# Patient Record
Sex: FEMALE | Race: WHITE | Employment: OTHER | URBAN - METROPOLITAN AREA
[De-identification: names, ages, dates, MRNs, and addresses within clinical notes are randomized per-mention and may not be internally consistent; named-entity substitution may affect disease eponyms.]

---

## 2018-04-06 ENCOUNTER — OFFICE VISIT (OUTPATIENT)
Dept: SURGERY | Age: 33
End: 2018-04-06

## 2018-04-06 VITALS
TEMPERATURE: 98.1 F | SYSTOLIC BLOOD PRESSURE: 118 MMHG | BODY MASS INDEX: 30.49 KG/M2 | DIASTOLIC BLOOD PRESSURE: 80 MMHG | HEIGHT: 65 IN | WEIGHT: 183 LBS

## 2018-04-06 DIAGNOSIS — K80.20 CALCULUS OF GALLBLADDER WITHOUT CHOLECYSTITIS WITHOUT OBSTRUCTION: Primary | ICD-10-CM

## 2018-04-06 PROCEDURE — G8417 CALC BMI ABV UP PARAM F/U: HCPCS | Performed by: SURGERY

## 2018-04-06 PROCEDURE — 99203 OFFICE O/P NEW LOW 30 MIN: CPT | Performed by: SURGERY

## 2018-04-06 PROCEDURE — G8427 DOCREV CUR MEDS BY ELIG CLIN: HCPCS | Performed by: SURGERY

## 2018-05-15 ENCOUNTER — TELEPHONE (OUTPATIENT)
Dept: SURGERY | Age: 33
End: 2018-05-15

## 2018-06-21 ENCOUNTER — TELEPHONE (OUTPATIENT)
Dept: SURGERY | Age: 33
End: 2018-06-21

## 2018-10-03 ENCOUNTER — APPOINTMENT (OUTPATIENT)
Dept: CT IMAGING | Age: 33
End: 2018-10-03
Payer: MEDICARE

## 2018-10-03 ENCOUNTER — HOSPITAL ENCOUNTER (EMERGENCY)
Age: 33
Discharge: HOME | End: 2018-10-03
Payer: MEDICARE

## 2018-10-03 VITALS
BODY MASS INDEX: 28.93 KG/M2 | OXYGEN SATURATION: 98 % | DIASTOLIC BLOOD PRESSURE: 87 MMHG | HEART RATE: 89 BPM | RESPIRATION RATE: 16 BRPM | SYSTOLIC BLOOD PRESSURE: 129 MMHG | TEMPERATURE: 97.7 F | HEIGHT: 66 IN | WEIGHT: 180 LBS

## 2018-10-03 DIAGNOSIS — S09.90XA INJURY OF HEAD, INITIAL ENCOUNTER: Primary | ICD-10-CM

## 2018-10-03 DIAGNOSIS — S00.81XA ABRASION OF FOREHEAD, INITIAL ENCOUNTER: ICD-10-CM

## 2018-10-03 PROCEDURE — 99283 EMERGENCY DEPT VISIT LOW MDM: CPT

## 2018-10-03 PROCEDURE — 70450 CT HEAD/BRAIN W/O DYE: CPT

## 2018-10-04 NOTE — ED PROVIDER NOTES
completed and is negative. Physical Exam:  Physical Exam   Constitutional: She appears well-developed and well-nourished. HENT:   Head: Normocephalic. Head is with abrasion and with contusion. Head is without raccoon's eyes, without Abraham's sign and without laceration. Right Ear: External ear normal.   Left Ear: External ear normal.   Nose: Nose normal.   Eyes: Pupils are equal, round, and reactive to light. Conjunctivae and EOM are normal. Right eye exhibits no discharge. Left eye exhibits no discharge. Neck: Normal range of motion. Neck supple. Cardiovascular: Normal rate, regular rhythm and normal heart sounds. Exam reveals no gallop and no friction rub. No murmur heard. Pulmonary/Chest: Effort normal and breath sounds normal. No respiratory distress. She has no wheezes. She has no rales. Abdominal: Soft. She exhibits no distension. There is no tenderness. There is no rebound and no guarding. Musculoskeletal: Normal range of motion. Neurological: She is alert. Disoriented: baseline. Skin: Skin is warm and dry. She is not diaphoretic. Psychiatric: She has a normal mood and affect. Her behavior is normal.   Nursing note and vitals reviewed. MEDICAL DECISION MAKING    Vitals:    Vitals:    10/03/18 2202   BP: 129/87   Pulse: 89   Resp: 16   Temp: 97.7 °F (36.5 °C)   TempSrc: Temporal   SpO2: 98%   Weight: 180 lb (81.6 kg)   Height: 5' 6\" (1.676 m)       LABS: Labs Reviewed - No data to display     Remainder of labs reviewed and were negative at this time or not returned at the time of this note. RADIOLOGY:   Non-plain film images such as CT, Ultrasound and MRI are read by the radiologist. Zion Rudolph PA-C have directly visualized the radiologic plain film image(s) with the below findings:        Interpretation per the Radiologist below, if available at the time of this note:    CT HEAD WO CONTRAST   Final Result   1.  Limited study with soft tissue swelling in the forehead but no fracture or   definite acute intracranial hemorrhage. 2. On the left there is otitis externa, otitis media, and mastoiditis. Ct Head Wo Contrast    Result Date: 10/3/2018  EXAMINATION: CT OF THE HEAD WITHOUT CONTRAST  10/3/2018 10:34 pm TECHNIQUE: CT of the head was performed without the administration of intravenous contrast. Dose modulation, iterative reconstruction, and/or weight based adjustment of the mA/kV was utilized to reduce the radiation dose to as low as reasonably achievable. COMPARISON: None. HISTORY: ORDERING SYSTEM PROVIDED HISTORY: head injury TECHNOLOGIST PROVIDED HISTORY: Has a \"code stroke\" or \"stroke alert\" been called? ->No Ordering Physician Provided Reason for Exam: head injury Acuity: Acute Type of Exam: Initial Relevant Medical/Surgical History: Head Injury (was banging head on table at dinner, MRDD patient. lac to forehead. ) FINDINGS: BRAIN/VENTRICLES: Evaluation is limited by motion artifact despite repeat imaging. No definite acute hemorrhage is identified. There are focal round areas of low-attenuation in the inferior basal ganglia bilaterally. Most likely these represent dilated perivascular spaces. No other area of abnormal brain attenuation is identified. The ventricles are normal in size and configuration. ORBITS: The visualized portion of the orbits demonstrate no acute abnormality. SINUSES: The visualized paranasal sinuses are clear. The frontal sinuses are developmentally absent. There is opacification of mastoid air cells and middle ear on the left with skin thickening in the external auditory canal. Chronic mastoiditis is noted on the right SOFT TISSUES/SKULL:  There is subcutaneous soft tissue swelling in the forehead. No fracture is identified. 1. Limited study with soft tissue swelling in the forehead but no fracture or definite acute intracranial hemorrhage. 2. On the left there is otitis externa, otitis media, and mastoiditis. [id number]  [phone]  Go to   If symptoms worsen      DISCHARGE MEDICATIONS:  New Prescriptions    No medications on file       DISCONTINUED MEDICATIONS:  Discontinued Medications    No medications on file              (Please note the MDM and HPI sections of this note were completed with a voice recognition program.  Efforts were made to edit the dictations but occasionally words are mis-transcribed.)    Electronically signed, Niranjan Silva PA-C,          [place]  10/03/18 3553

## 2020-02-14 ENCOUNTER — OFFICE VISIT (OUTPATIENT)
Dept: SURGERY | Age: 35
End: 2020-02-14
Payer: MEDICARE

## 2020-02-14 PROCEDURE — 99212 OFFICE O/P EST SF 10 MIN: CPT | Performed by: SURGERY

## 2020-02-14 PROCEDURE — G8484 FLU IMMUNIZE NO ADMIN: HCPCS | Performed by: SURGERY

## 2020-02-14 PROCEDURE — G8421 BMI NOT CALCULATED: HCPCS | Performed by: SURGERY

## 2020-02-14 PROCEDURE — G8428 CUR MEDS NOT DOCUMENT: HCPCS | Performed by: SURGERY

## 2020-02-14 ASSESSMENT — ENCOUNTER SYMPTOMS
EYES NEGATIVE: 1
RESPIRATORY NEGATIVE: 1
ALLERGIC/IMMUNOLOGIC NEGATIVE: 1
GASTROINTESTINAL NEGATIVE: 1

## 2020-02-14 NOTE — PROGRESS NOTES
Provider, MD   ibuprofen (ADVIL;MOTRIN) 400 MG tablet Take 400 mg by mouth every 6 hours as needed for Pain. Historical Provider, MD   ranitidine (ZANTAC) 150 MG tablet Take 150 mg by mouth 2 times daily. Historical Provider, MD   ferrous sulfate 325 (65 FE) MG tablet Take 325 mg by mouth daily (with breakfast). Historical Provider, MD   OLANZapine (ZYPREXA) 10 MG tablet Take 10 mg by mouth 2 times daily. Historical Provider, MD   magnesium hydroxide (MILK OF MAGNESIA) 400 MG/5ML suspension Take  by mouth daily as needed. Historical Provider, MD   loperamide (IMODIUM) 2 MG capsule Take 2 mg by mouth every 6 hours as needed. Historical Provider, MD   chlorproMAZINE (THORAZINE) 25 MG tablet Take 25 mg by mouth 3 times daily. Historical Provider, MD   EPINEPHrine HCl, Anaphylaxis, (EPIPEN IM) Inject  into the muscle. Historical Provider, MD   LORazepam (ATIVAN) 1 MG tablet Take 0.5 mg by mouth 2 times daily   Historical Provider, MD   prenatal vitamin (PRENATAL-S) 27-0.8 MG TABS Take 1 tablet by mouth daily. Historical Provider, MD   sertraline (ZOLOFT) 100 MG tablet Take 25 mg by mouth every 12 hours   Historical Provider, MD   aspirin 81 MG EC tablet Take 81 mg by mouth daily. Historical Provider, MD   loratadine (CLARITIN) 10 MG tablet Take 10 mg by mouth daily. Historical Provider, MD   tretinoin (RETIN-A) 0.05 % cream Apply  topically nightly. Apply topically nightly.    Historical Provider, MD   topiramate (TOPAMAX) 25 MG capsule Take 75 mg by mouth 2 times daily   Historical Provider, MD       Social History     Socioeconomic History    Marital status: Single     Spouse name: Not on file    Number of children: Not on file    Years of education: Not on file    Highest education level: Not on file   Occupational History    Not on file   Social Needs    Financial resource strain: Not on file    Food insecurity:     Worry: Not on file     Inability: Not on file   [email or web address] needs: Medical: Not on file     Non-medical: Not on file   Tobacco Use    Smoking status: Never Smoker    Smokeless tobacco: Never Used   Substance and Sexual Activity    Alcohol use: Not on file    Drug use: No    Sexual activity: Not on file   Lifestyle    Physical activity:     Days per week: Not on file     Minutes per session: Not on file    Stress: Not on file   Relationships    Social connections:     Talks on phone: Not on file     Gets together: Not on file     Attends [employer] service: Not on file     Active member of club or organization: Not on file     Attends meetings of clubs or organizations: Not on file     Relationship status: Not on file    Intimate partner violence:     Fear of current or ex partner: Not on file     Emotionally abused: Not on file     Physically abused: Not on file     Forced sexual activity: Not on file   Other Topics Concern    Not on file   Social History Narrative    Not on file       Review of Systems   Constitutional: Negative. HENT: Negative. Eyes: Negative. Respiratory: Negative. Cardiovascular: Negative. Gastrointestinal: Negative. Endocrine: Negative. Genitourinary: Negative. Musculoskeletal: Negative. Skin: Negative. Allergic/Immunologic: Negative. Neurological: Negative. Hematological: Negative. Psychiatric/Behavioral: Negative.        :   Physical Exam  Constitutional:       Appearance: She is well-developed. HENT:      Head: Normocephalic and atraumatic. Right Ear: External ear normal.      Left Ear: External ear normal.   Eyes:      Conjunctiva/sclera: Conjunctivae normal.   Neck:      Musculoskeletal: Normal range of motion and neck supple. Cardiovascular:      Rate and Rhythm: Normal rate and regular rhythm. Pulmonary:      Effort: Pulmonary effort is normal.      Breath sounds: Normal breath sounds. Musculoskeletal: Normal range of motion. Skin:     General: Skin is warm and dry. Neurological:      Mental Status: She is alert and oriented to person, place, and time. Psychiatric:         Behavior: Behavior normal.     2 cm sebaceous cyst of the medial left breast  There were no vitals taken for this visit. :      77-year-old female with autism and severe developmental delay who was referred for evaluation of a cyst on the left breast.  No breast history is able to be obtained per the patient or her caregivers. Physical examination reveals a 2 cm, noninfected sebaceous cyst of the left breast.      Plan:      No surgical intervention commended unless the sebaceous cyst of the left breast becomes bothersome or infected. Follow-up as needed.

## 2020-08-22 ENCOUNTER — HOSPITAL ENCOUNTER (EMERGENCY)
Age: 35
Discharge: HOME | End: 2020-08-22
Payer: MEDICARE

## 2020-08-22 ENCOUNTER — APPOINTMENT (OUTPATIENT)
Dept: GENERAL RADIOLOGY | Age: 35
End: 2020-08-22
Payer: MEDICARE

## 2020-08-22 VITALS
TEMPERATURE: 98 F | OXYGEN SATURATION: 96 % | SYSTOLIC BLOOD PRESSURE: 122 MMHG | DIASTOLIC BLOOD PRESSURE: 72 MMHG | HEART RATE: 88 BPM | RESPIRATION RATE: 16 BRPM

## 2020-08-22 PROCEDURE — U0002 COVID-19 LAB TEST NON-CDC: HCPCS

## 2020-08-22 PROCEDURE — U0003 INFECTIOUS AGENT DETECTION BY NUCLEIC ACID (DNA OR RNA); SEVERE ACUTE RESPIRATORY SYNDROME CORONAVIRUS 2 (SARS-COV-2) (CORONAVIRUS DISEASE [COVID-19]), AMPLIFIED PROBE TECHNIQUE, MAKING USE OF HIGH THROUGHPUT TECHNOLOGIES AS DESCRIBED BY CMS-2020-01-R: HCPCS

## 2020-08-22 PROCEDURE — 71045 X-RAY EXAM CHEST 1 VIEW: CPT

## 2020-08-22 PROCEDURE — 99283 EMERGENCY DEPT VISIT LOW MDM: CPT

## 2020-08-22 NOTE — ED PROVIDER NOTES
Take 1 tablet by mouth daily.  sertraline (ZOLOFT) 100 MG tablet Take 25 mg by mouth every 12 hours       aspirin 81 MG EC tablet Take 81 mg by mouth daily.  loratadine (CLARITIN) 10 MG tablet Take 10 mg by mouth daily.  tretinoin (RETIN-A) 0.05 % cream Apply  topically nightly. Apply topically nightly.  topiramate (TOPAMAX) 25 MG capsule Take 75 mg by mouth 2 times daily          ALLERGIES    Allergies   Allergen Reactions    Bee Venom     Codeine        FAMILY AND SOCIAL HISTORY    No family history on file.   Social History     Socioeconomic History    Marital status: Single     Spouse name: Not on file    Number of children: Not on file    Years of education: Not on file    Highest education level: Not on file   Occupational History    Not on file   Social Needs    Financial resource strain: Not on file    Food insecurity     Worry: Not on file     Inability: Not on file    Transportation needs     Medical: Not on file     Non-medical: Not on file   Tobacco Use    Smoking status: Never Smoker    Smokeless tobacco: Never Used   Substance and Sexual Activity    Alcohol use: Not on file    Drug use: No    Sexual activity: Not on file   Lifestyle    Physical activity     Days per week: Not on file     Minutes per session: Not on file    Stress: Not on file   Relationships    Social connections     Talks on phone: Not on file     Gets together: Not on file     Attends Baptism service: Not on file     Active member of club or organization: Not on file     Attends meetings of clubs or organizations: Not on file     Relationship status: Not on file    Intimate partner violence     Fear of current or ex partner: Not on file     Emotionally abused: Not on file     Physically abused: Not on file     Forced sexual activity: Not on file   Other Topics Concern    Not on file   Social History Narrative    Not on file       PHYSICAL EXAM    VITAL SIGNS: /72   Pulse 88 cerebrovascular dz,  chronic lung dz*(exclude Asthma), chronic renal dz, chronic liver dz= 1 pt each    Total= 3 points    Score ? 8: Low Risk  Workup: CXR ? Normal = low risk  If CXR shows infiltrate go to Intermediate Risk    Score 9-12: Intermediate Risk  Workup: CBC, CMP, Troponin, LDH, Lactate, CXR  If any below present, go to High Risk  GCS <15, > mild infiltrate, lobar infiltrate, multi-lobar infiltrate, lymphopenia, ? LDH, ? troponin, ?lactate, thrombocytopenia. Score >12:  High Risk? Admit. I instructed the patients guardian to have the patient follow up as an outpatient in 2 days. I instructed the guardian to return the patient to the ED immediately for any new or worsening symptoms. The patients guardian verbalizes understanding. FINAL IMPRESSION    1.  Cough        PLAN  Outpatient treatment, discharge instructions, and follow-up (see EMR)    (Please note that this note was completed with a voice recognition program.  Every attempt was made to edit the dictations, but inevitably there remain words that are mis-transcribed.)        Nereida Godoy, GABBIE - ANAIS  08/22/20 6211

## 2020-08-23 LAB — SARS-COV-2, PCR: DETECTED

## 2020-08-23 NOTE — RESULT ENCOUNTER NOTE
Patient's positive result has been appropriately evaluated by the provider pool. Patient was unable to be reached over the phone. Mobile number disconnected, home number is a work place, maybe you can reach her there tomorrow. Will try to reach patient again tomorrow per protocol.

## 2020-08-23 NOTE — RESULT ENCOUNTER NOTE
Covid results reviewed, positive. Please call and let patient know so they can be sure to follow full quarantine instructions.

## 2020-08-24 ENCOUNTER — CARE COORDINATION (OUTPATIENT)
Dept: CARE COORDINATION | Age: 35
End: 2020-08-24

## 2022-02-18 ENCOUNTER — HOSPITAL ENCOUNTER (EMERGENCY)
Age: 37
Discharge: HOME | End: 2022-02-18
Attending: EMERGENCY MEDICINE
Payer: MEDICARE

## 2022-02-18 VITALS
RESPIRATION RATE: 17 BRPM | OXYGEN SATURATION: 98 % | TEMPERATURE: 97.6 F | SYSTOLIC BLOOD PRESSURE: 116 MMHG | HEIGHT: 66 IN | BODY MASS INDEX: 32.62 KG/M2 | WEIGHT: 203 LBS | HEART RATE: 89 BPM | DIASTOLIC BLOOD PRESSURE: 79 MMHG

## 2022-02-18 DIAGNOSIS — L02.219 CELLULITIS AND ABSCESS OF TRUNK: Primary | ICD-10-CM

## 2022-02-18 DIAGNOSIS — L03.319 CELLULITIS AND ABSCESS OF TRUNK: Primary | ICD-10-CM

## 2022-02-18 PROCEDURE — 99282 EMERGENCY DEPT VISIT SF MDM: CPT

## 2022-02-18 RX ORDER — CEPHALEXIN 500 MG/1
500 CAPSULE ORAL 4 TIMES DAILY
Qty: 40 CAPSULE | Refills: 0 | Status: SHIPPED | OUTPATIENT
Start: 2022-02-18 | End: 2022-04-22

## 2022-02-18 RX ORDER — SULFAMETHOXAZOLE AND TRIMETHOPRIM 800; 160 MG/1; MG/1
1 TABLET ORAL 2 TIMES DAILY
Qty: 20 TABLET | Refills: 0 | Status: SHIPPED | OUTPATIENT
Start: 2022-02-18 | End: 2022-02-28

## 2022-02-18 NOTE — ED NOTES
Pt is special needs with caregiver at bedside. Unable to have appropriate conversation. Pt has noted left breast abscess, warm to touch, with a great amount of surrounding redness.       Divya Dyer RN  02/18/22 1730

## 2022-02-18 NOTE — ED PROVIDER NOTES
I independently performed a history and physical on Romina Lynch. I personally saw the patient and performed a substantive portion of the visit including all aspects of the medical decision making. Briefly, this is a 44 y.o. female here for breast infection. Over left medial breast.  Never had this before. It is swollen, red. Patient is unable to give any history. She is in her baseline mental status. She is tolerating oral intake. No fevers. Has not appeared to be in pain. On exam,   General: Patient is in no acute distress  Skin: No cyanosis. Erythema surrounding what appears to be an abscess of her left medial breast with warmth and tenderness  HEENT: Moist mucous membranes  Heart: Regular rate, regular rhythm  Lung: No respiratory distress  Abdomen: Soft, nontender  Neuro: Moving all extremities, no facial droop, no slurred speech, answers questions appropriately        Screenings   Smoot Coma Scale  Eye Opening: Spontaneous  Best Verbal Response: Oriented (pt is at baseline)  Best Motor Response: Obeys commands  Smoot Coma Scale Score: 15        MDM  Briefly, this is a 44 y.o. female here for left breast infection. Appears to have superficial abscess with surrounding cellulitis. Will prescribe Bactrim and Keflex. Will refer to breast surgery team for incision and drainage. Vitals unremarkable. She is tolerating oral intake and is at her baseline mental status. No signs of sepsis/end organ damage.  .         Patient Referrals:  Judi Deutsch MD  [address] [facility] [address] [phone]    Schedule an appointment as soon as possible for a visit in 3 days  For re-check    [facility]  [address] [place] [address]  [phone]    As needed      Discharge Medications:  New Prescriptions    CEPHALEXIN (KEFLEX) 500 MG CAPSULE    Take 1 capsule by mouth 4 times daily    SULFAMETHOXAZOLE-TRIMETHOPRIM (BACTRIM DS) 800-160 MG PER

## 2022-02-18 NOTE — ED NOTES
Bed: 07  Expected date:   Expected time:   Means of arrival:   Comments:  Amber Rick RN  02/18/22 9002

## 2022-02-18 NOTE — ED PROVIDER NOTES
[facility]        Pt Name: Leeann Kovacs  MRN: [id number]  [place] 1985  Date of evaluation: 2/18/2022  Provider: Nuria Saez PA-C  PCP: Theodore Wiggins  Note Started: 10:05 AM EST        I have seen and evaluated this patient with my supervising physician Jeremy Ibanez MD.    [facility]       Chief Complaint   Patient presents with    Abscess     hard knot noted to left breast with some redness and warmth to area, no drainage, pt is special needs       HISTORY OF PRESENT ILLNESS   (Location, Timing/Onset, Context/Setting, Quality, Duration, Modifying Factors, Severity, Associated Signs and Symptoms)  Note limiting factors. Chief Complaint: Breast abscess    Leeann Kovacs is a 44 y.o. female who presents to the emergency department from care works with her caregiver [place]. Patient has history of autism and bipolar disorder. Not speaking and only making noises at this time. Caregiver states she has been acting normally. There is been no vomiting, appetite change, fevers or any other symptoms. States that this abscess is developed over the past 24 to 36 hours. Has history of skin infections in the past but unsure of any known history of MRSA. Caregiver denies any other symptoms. Nursing Notes were all reviewed and agreed with or any disagreements were addressed in the HPI. REVIEW OF SYSTEMS    (2-9 systems for level 4, 10 or more for level 5)     Review of Systems    Positives and Pertinent negatives as per HPI. Except as noted above in the ROS, all other systems were reviewed and negative.        PAST MEDICAL HISTORY     Past Medical History:   Diagnosis Date    Autism     Bipolar 1 disorder ([address]     Depression     Esophagitis     Gastric ulcer     History of ear infections     Insomnia     OCD (obsessive compulsive disorder)          SURGICAL HISTORY     Past Surgical History:   Procedure Laterality Date   [address]       Discharge Medication List as of 2/18/2022 11:11 AM      CONTINUE these medications which have NOT CHANGED    Details   norgestrel-ethinyl estradiol (LOW-OGESTREL) 0.3-30 MG-MCG per tablet Take 1 tablet by mouth daily      FLUoxetine (PROZAC) 20 MG capsule Take 20 mg by mouth daily      Propanediol LIQD by Does not apply route      traZODone (DESYREL) 100 MG tablet Take 75 mg by mouth 3 times daily      valACYclovir (VALTREX) 1 G tablet Take 1,000 mg by mouth as needed      polyethylene glycol (GLYCOLAX) packet Take 17 g by mouth daily as needed. meclizine (ANTIVERT) 12.5 MG tablet Take 12.5 mg by mouth 2 times daily. propranolol (INDERAL) 80 MG tablet Take 40 mg by mouth 3 times daily       simethicone (MYLANTA GAS) 125 MG chewable tablet Take 125 mg by mouth every 6 hours as needed for Flatulence. acetaminophen (TYLENOL) 325 MG tablet Take 650 mg by mouth every 4 hours as needed for Pain. clonazePAM (KLONOPIN) 0.5 MG tablet Take 0.5 mg by mouth nightly as needed for Anxiety. ibuprofen (ADVIL;MOTRIN) 400 MG tablet Take 400 mg by mouth every 6 hours as needed for Pain.      ranitidine (ZANTAC) 150 MG tablet Take 150 mg by mouth 2 times daily. ferrous sulfate 325 (65 FE) MG tablet Take 325 mg by mouth daily (with breakfast). OLANZapine (ZYPREXA) 10 MG tablet Take 10 mg by mouth 2 times daily. magnesium hydroxide (MILK OF MAGNESIA) 400 MG/5ML suspension Take  by mouth daily as needed. loperamide (IMODIUM) 2 MG capsule Take 2 mg by mouth every 6 hours as needed. chlorproMAZINE (THORAZINE) 25 MG tablet Take 25 mg by mouth 3 times daily. EPINEPHrine HCl, Anaphylaxis, (EPIPEN IM) Inject  into the muscle. LORazepam (ATIVAN) 1 MG tablet Take 0.5 mg by mouth 2 times daily       prenatal vitamin (PRENATAL-S) 27-0.8 MG TABS Take 1 tablet by mouth daily.         sertraline (ZOLOFT) 100 MG tablet Take 25 mg by mouth every 12 hours       aspirin 81 MG EC tablet Take 81 mg by mouth daily. loratadine (CLARITIN) 10 MG tablet Take 10 mg by mouth daily. tretinoin (RETIN-A) 0.05 % cream Apply  topically nightly. Apply topically nightly. , Topical, Historical Med      topiramate (TOPAMAX) 25 MG capsule Take 75 mg by mouth 2 times daily                ALLERGIES     Bee venom and Codeine    FAMILYHISTORY     No family history on file. SOCIAL HISTORY       Social History     Tobacco Use    Smoking status: Never Smoker    Smokeless tobacco: Never Used   Vaping Use    Vaping Use: Never used   Substance Use Topics    Alcohol use: Not on file    Drug use: No       SCREENINGS    [place] Coma Scale  Eye Opening: Spontaneous  Best Verbal Response: Oriented (pt is at baseline)  Best Motor Response: Obeys commands  [place] Coma Scale Score: 15        PHYSICAL EXAM    (up to 7 for level 4, 8 or more for level 5)     ED Triage Vitals [02/18/22 0928]   BP Temp Temp Source Pulse Resp SpO2 Height Weight   116/79 -- Tympanic 89 17 98 % 5' 6\" (1.676 m) 203 lb (92.1 kg)       Physical Exam  Vitals and nursing note reviewed. Constitutional:       Appearance: She is well-developed. She is not diaphoretic. HENT:      Head: Atraumatic. Nose: Nose normal.   Eyes:      General:         Right eye: No discharge. Left eye: No discharge. Cardiovascular:      Rate and Rhythm: Normal rate and regular rhythm. Heart sounds: No murmur heard. No friction rub. No gallop. Pulmonary:      Effort: Pulmonary effort is normal. No respiratory distress. Breath sounds: No stridor. No wheezing, rhonchi or rales. Abdominal:      General: Bowel sounds are normal. There is no distension. Palpations: Abdomen is soft. There is no mass. Tenderness: There is no abdominal tenderness. There is no guarding or rebound. Hernia: No hernia is present.    Musculoskeletal:         General: No swelling. Normal range of motion. Cervical back: Normal range of motion. Skin:     General: Skin is warm and dry. Findings: No erythema or rash. Comments: There is a abscess over the medial left breast measuring 4 x 2-1/2 cm with some cellulitis streaking laterally with cellulitis measuring approximately 12 x 5 cm total.  No crepitus or streaking. Neurological:      Mental Status: She is alert and oriented to person, place, and time. Cranial Nerves: No cranial nerve deficit. Psychiatric:         Behavior: Behavior normal.         DIAGNOSTIC RESULTS   LABS:    Labs Reviewed - No data to display    When ordered only abnormal lab results are displayed. All other labs were within normal range or not returned as of this dictation. EKG: When ordered, EKG's are interpreted by the Emergency Department Physician in the absence of a cardiologist.  Please see their note for interpretation of EKG. RADIOLOGY:   Non-plain film images such as CT, Ultrasound and MRI are read by the radiologist. Plain radiographic images are visualized and preliminarily interpreted by the ED Provider with the below findings:        Interpretation per the Radiologist below, if available at the time of this note:    No orders to display     No results found.         PROCEDURES   Unless otherwise noted below, none     Procedures    CRITICAL CARE TIME       CONSULTS:  None      EMERGENCY DEPARTMENT COURSE and DIFFERENTIAL DIAGNOSIS/MDM:   Vitals:    Vitals:    02/18/22 0928 02/18/22 1011   BP: 116/79    Pulse: 89    Resp: 17    Temp:  97.6 °F (36.4 °C)   TempSrc: Tympanic Axillary   SpO2: 98%    Weight: 203 lb (92.1 kg)    Height: 5' 6\" (1.676 m)        Patient was given the following medications:  Medications - No data to display        Patient presented with abscess and cellulitis of the left breast.  Given difficulties with patient's autism and unable to be sit still and where this abscess is feel better just to place patient on antibiotics and follow-up with breast surgeon for better cosmetic and overall care of this. She is not toxic appearance and no sign of sepsis. She has been acting normally. Will start on Keflex and Bactrim. Nothing distress streaking cellulitis requiring IV antibiotics, necrotizing fasciitis. Always a concern for possible malignancy causing this also so feel better with blood specialist following up with breast specialist.  Return here for any worsening of symptoms or problems at home. FINAL IMPRESSION      1.  Cellulitis and abscess of trunk          DISPOSITION/PLAN   DISPOSITION Decision To Discharge 02/18/2022 10:32:48 AM      PATIENT REFERRED TO:  Monica Bria, [address]  [phone]    Schedule an appointment as soon as possible for a visit in 3 days  For re-check    [facility] Emergency Department  [address] [place] [address]  [phone]    As needed      DISCHARGE MEDICATIONS:  Discharge Medication List as of 2/18/2022 11:11 AM      START taking these medications    Details   sulfamethoxazole-trimethoprim (BACTRIM DS) 800-160 MG per tablet Take 1 tablet by mouth 2 times daily for 10 days, Disp-20 tablet, R-0Print      cephALEXin (KEFLEX) 500 MG capsule Take 1 capsule by mouth 4 times daily, Disp-40 capsule, R-0Print             DISCONTINUED MEDICATIONS:  Discharge Medication List as of 2/18/2022 11:11 AM                 (Please note that portions of this note were completed with a voice recognition program.  Efforts were made to edit the dictations but occasionally words are mis-transcribed.)    Brandy Aden PA-C (electronically signed)           Brandy Aden PA-C  02/18/22 3948

## 2022-04-13 ENCOUNTER — OFFICE VISIT (OUTPATIENT)
Dept: BREAST CENTER | Age: 37
End: 2022-04-13
Payer: MEDICARE

## 2022-04-13 ENCOUNTER — TELEPHONE (OUTPATIENT)
Dept: SURGERY | Age: 37
End: 2022-04-13

## 2022-04-13 VITALS
BODY MASS INDEX: 33.68 KG/M2 | HEIGHT: 66 IN | WEIGHT: 209.6 LBS | SYSTOLIC BLOOD PRESSURE: 126 MMHG | HEART RATE: 100 BPM | DIASTOLIC BLOOD PRESSURE: 93 MMHG

## 2022-04-13 DIAGNOSIS — N63.20 LEFT BREAST MASS: Primary | ICD-10-CM

## 2022-04-13 PROCEDURE — G8417 CALC BMI ABV UP PARAM F/U: HCPCS | Performed by: SURGERY

## 2022-04-13 PROCEDURE — G8427 DOCREV CUR MEDS BY ELIG CLIN: HCPCS | Performed by: SURGERY

## 2022-04-13 PROCEDURE — 99214 OFFICE O/P EST MOD 30 MIN: CPT | Performed by: SURGERY

## 2022-04-13 NOTE — TELEPHONE ENCOUNTER
Breast History:  History of Previous Breast Biopsy:no  Self Breast Exams Completed:no  Family History of Breast Cancer:no    Family History of Other Cancers:no   Ashkenazi Lutheran Decent:no  Bra Size: unknown    Gyne History:  : 0,   Para: 0  Age of Menarche: unknown  Age of Menopause: No  Age of first live Birth:N/a  History of Hysterectomy / TAYLOR-BSO: no  History of OCP's/HRT:unknown     Family History or personal history of Ovarian Cancer: unknown     Patient is non-verbal      12.9% Lifetime Risk

## 2022-04-13 NOTE — PATIENT INSTRUCTIONS
Mammogram results were discussed with patient today in office  Breast exam was unremarkable for any palpable masses  Discussed surgery Risks / recovery / aftercare  Needs a Pre Op Exam ( Exam form Given )   Surgery date - May 5, 2022   Surgery Consent signed      Healthy Lifestyle Recommendations: healthy diet (decrease consumption of red meat, increase fresh fruits and vegetables), decreased alcohol consumption (less than 4 drinks/week), adequate sleep (goal 6-8 hours), routine exercise (goal 150 minutes/week or greater), weight control.

## 2022-04-13 NOTE — PROGRESS NOTES
Subjective:      Patient ID: Rossy Desai is a 44 y.o. female. HPI   Chief Complaint   Patient presents with    Consultation     NP - Left Breast Cyst, referred by Dr. Carol Godoy     Patient presented in February to the ER in [facility] with an infected left breast cyst, treated with antibiotics. This cyst has been present for several years. No pain. Patient has history of autism and bipolar disorder. Not speaking and only making noises at this time. She is here with her caregiver, Fabio Umanzor. History obtained from chart and caregivers.     Breast History:  History of Previous Breast Biopsy:no  Self Breast Exams Completed:no  Family History of Breast Cancer:no    Family History of Other Cancers:no   Ashkenazi Gnosticist Decent:no  Bra Size: unknown     Gyne History:  : 0,   Para: 0  Age of Menarche: unknown  Age of Menopause: No  Age of first live Birth:N/a  History of Hysterectomy / TAYLOR-BSO: no  History of OCP's/HRT:unknown     Family History or personal history of Ovarian Cancer: unknown      Patient is non-verbal        12.9% Lifetime Risk                    Past Medical History:   Diagnosis Date    Autism     Bipolar 1 disorder ([facility] [address]     Depression     Esophagitis     Gastric ulcer     History of ear infections     Insomnia     OCD (obsessive compulsive disorder)        Past Surgical History:   Procedure Laterality Date    DENTAL SURGERY         Current Outpatient Medications   Medication Sig Dispense Refill    cephALEXin (KEFLEX) 500 MG capsule Take 1 capsule by mouth 4 times daily 40 capsule 0    norgestrel-ethinyl estradiol (LOW-OGESTREL) 0.3-30 MG-MCG per tablet Take 1 tablet by mouth daily      FLUoxetine (PROZAC) 20 MG capsule Take 20 mg by mouth daily      Propanediol LIQD by Does not apply route      traZODone (DESYREL) 100 MG tablet Take 75 mg by mouth 3 times daily      valACYclovir (VALTREX) 1 G tablet Take 1,000 mg by mouth as needed      polyethylene glycol (GLYCOLAX) packet Take 17 g by mouth daily as needed.  meclizine (ANTIVERT) 12.5 MG tablet Take 12.5 mg by mouth 2 times daily.  propranolol (INDERAL) 80 MG tablet Take 40 mg by mouth 3 times daily       simethicone (MYLANTA GAS) 125 MG chewable tablet Take 125 mg by mouth every 6 hours as needed for Flatulence.  acetaminophen (TYLENOL) 325 MG tablet Take 650 mg by mouth every 4 hours as needed for Pain.  clonazePAM (KLONOPIN) 0.5 MG tablet Take 0.5 mg by mouth nightly as needed for Anxiety.  ibuprofen (ADVIL;MOTRIN) 400 MG tablet Take 400 mg by mouth every 6 hours as needed for Pain.  ranitidine (ZANTAC) 150 MG tablet Take 150 mg by mouth 2 times daily.  ferrous sulfate 325 (65 FE) MG tablet Take 325 mg by mouth daily (with breakfast).  OLANZapine (ZYPREXA) 10 MG tablet Take 10 mg by mouth 2 times daily.  magnesium hydroxide (MILK OF MAGNESIA) 400 MG/5ML suspension Take  by mouth daily as needed.  loperamide (IMODIUM) 2 MG capsule Take 2 mg by mouth every 6 hours as needed.  chlorproMAZINE (THORAZINE) 25 MG tablet Take 25 mg by mouth 3 times daily.  EPINEPHrine HCl, Anaphylaxis, (EPIPEN IM) Inject  into the muscle.  LORazepam (ATIVAN) 1 MG tablet Take 0.5 mg by mouth 2 times daily       prenatal vitamin (PRENATAL-S) 27-0.8 MG TABS Take 1 tablet by mouth daily.  sertraline (ZOLOFT) 100 MG tablet Take 25 mg by mouth every 12 hours       aspirin 81 MG EC tablet Take 81 mg by mouth daily.  loratadine (CLARITIN) 10 MG tablet Take 10 mg by mouth daily.  tretinoin (RETIN-A) 0.05 % cream Apply  topically nightly. Apply topically nightly.  topiramate (TOPAMAX) 25 MG capsule Take 75 mg by mouth 2 times daily        No current facility-administered medications for this visit.        Social History     Socioeconomic History    Marital status: Single     Spouse name: Not on file    Number of children: Not on file    Years of education: Not on file    Highest education level: Not on file   Occupational History    Not on file   Tobacco Use    Smoking status: Never Smoker    Smokeless tobacco: Never Used   Vaping Use    Vaping Use: Never used   Substance and Sexual Activity    Alcohol use: Not on file    Drug use: No    Sexual activity: Not on file   Other Topics Concern    Not on file   Social History Narrative    Not on file     Social Determinants of Health     Financial Resource Strain:     Difficulty of Paying Living Expenses: Not on file   Food Insecurity:     Worried About Running Out of Food in the Last Year: Not on file    [place] of Food in the Last Year: Not on file   Transportation Needs:     Lack of Transportation (Medical): Not on file    Lack of Transportation (Non-Medical): Not on file   Physical Activity:     Days of Exercise per Week: Not on file    Minutes of Exercise per Session: Not on file   Stress:     Feeling of Stress : Not on file   Social Connections:     Frequency of Communication with Friends and Family: Not on file    Frequency of Social Gatherings with Friends and Family: Not on file    Attends Baptism Services: Not on file    Active Member of [address] [place] [address] or Organizations: Not on file    Attends Club or Organization Meetings: Not on file    Marital Status: Not on file   Intimate Partner Violence:     Fear of Current or Ex-Partner: Not on file    Emotionally Abused: Not on file    Physically Abused: Not on file    Sexually Abused: Not on file   Housing Stability:     Unable to Pay for Housing in the Last Year: Not on file    Number of [place] in the Last Year: Not on file    Unstable Housing in the Last Year: Not on file       Objective:   Physical Exam    Left breast - Pendulous breast, 1.5 cm raised mass upper inner quadrant near sternum with central punctum. No signs of infection. No other masses. Right breast - Pendulous breast, no masses, no skin or nipple changes.   No cervical or axillary adenopathy. Assessment:      Diagnosis Orders   1. Left breast mass            Plan:     We discussed management options. I recommend we proceed with excision of her left breast mass since it is symptomatic. The indications for the planned procedure, along with the potential benefits and risks which include but are not limited to the risk of anesthesia, bleeding, infection, possible failed operation, possible need for additional surgery pending final pathologic assessment, lymphedema, sensation changes, nerve injury, persistent pain, and unappealing cosmetics were reviewed. The discussion I have done encompasses risks, benefits, and side effects related to the alternatives and the risks related to not receiving the proposed care, treatment, and services. All questions were answered and she agrees to proceed. On this date 04/13/22 I have spent 60 minutes reviewing previous notes, test results, and face to face with the patient discussing the diagnosis and importance of compliance with the treatment plan as well as documenting on the day of the visit.      Electronically signed by Zuleika Pierson MD on 4/13/2022 at 11:31 AM

## 2022-04-22 ENCOUNTER — TELEPHONE (OUTPATIENT)
Dept: SURGERY | Age: 37
End: 2022-04-22

## 2022-04-22 RX ORDER — NYSTATIN 100000 U/G
OINTMENT TOPICAL 2 TIMES DAILY
COMMUNITY

## 2022-04-22 RX ORDER — FAMOTIDINE 20 MG/1
20 TABLET, FILM COATED ORAL DAILY
COMMUNITY

## 2022-04-22 RX ORDER — CLOTRIMAZOLE 1 %
CREAM (GRAM) TOPICAL 2 TIMES DAILY
COMMUNITY

## 2022-04-22 RX ORDER — MELOXICAM 7.5 MG/1
7.5 TABLET ORAL DAILY
COMMUNITY

## 2022-04-22 RX ORDER — PROPRANOLOL HYDROCHLORIDE 40 MG/1
40 TABLET ORAL 3 TIMES DAILY
COMMUNITY

## 2022-04-22 RX ORDER — LANOLIN ALCOHOL/MO/W.PET/CERES
1000 CREAM (GRAM) TOPICAL DAILY
COMMUNITY

## 2022-04-22 RX ORDER — CHOLECALCIFEROL (VITAMIN D3) 125 MCG
CAPSULE ORAL
COMMUNITY

## 2022-04-22 RX ORDER — FLUTICASONE PROPIONATE 50 MCG
1 SPRAY, SUSPENSION (ML) NASAL DAILY
COMMUNITY

## 2022-04-22 RX ORDER — NYSTATIN 10B UNIT
POWDER (EA) MISCELLANEOUS 2 TIMES DAILY
COMMUNITY

## 2022-04-22 RX ORDER — GINSENG 100 MG
CAPSULE ORAL PRN
COMMUNITY

## 2022-04-22 RX ORDER — FLUVOXAMINE MALEATE 50 MG/1
50 TABLET, COATED ORAL 2 TIMES DAILY
COMMUNITY

## 2022-04-22 RX ORDER — TRAZODONE HYDROCHLORIDE 50 MG/1
50 TABLET ORAL NIGHTLY
COMMUNITY

## 2022-04-22 NOTE — FLOWSHEET NOTE
Preoperative Screening for Elective Surgery/Invasive Procedures While COVID-19 present in the community     1. Have you tested positive or have been told to self-isolate for COVID-19 like symptoms within the past 28 days? 2. Do you currently have any of the following symptoms? ? Fever >100.0 F or 99.9 F in immunocompromised patients? ? New onset cough, shortness of breath or difficulty breathing? ? New onset sore throat, myalgia (muscle aches and pains), headache, loss of taste/smell or diarrhea? 3. Have you had a potential exposure to COVID-19 within the past 14 days by:  ? Close contact with a confirmed case? ? Close contact with a healthcare worker,  or essential infrastructure worker (grocery store, [employer], gas station, public utilities or transportation)? ? Do you reside in a congregate setting such as; skilled nursing facility, adult home, correctional facility, homeless shelter or other institutional setting? ? Have you had recent travel to a known COVID-19 hotspot? * Admitted with diarrhea? [] YES    [x]  NO     *Prior history of C-Diff. In last 3 months? [] YES    [x]  NO     *Antibiotic use in the past 6-8 weeks? [x]  NO    []  YES      If yes, which: REASON_________________     *Prior hospitalization or nursing home in the last month? []  YES    []  NO     SAFETY FIRST. .call before you fall    [facility] time___5/5/22 0900________        Surgery time_1035___________    Do not eat or drink anything after 12:00 midnight prior to your surgery. This includes water chewing gum, mints and ice chips- the Day of Surgery. You may brush your teeth and gargle the morning of your surgery, but do not swallow the water     Please see your family doctor/pediatrician for a history and physical and/or questions concerning medications. Bring any test results/reports from your physicians office.    If you are under the care of a heart doctor or specialist doctor, please be aware that you may be asked to them for clearance    You may be asked to stop blood thinners such as Coumadin, Plavix, Fragmin, Lovenox, etc., or any anti-inflammatories such as:  Aspirin, Ibuprofen, Advil, Naproxen prior to your surgery. We also ask that you stop any OTC medications such as fish oil, vitamin E, glucosamine, garlic, Multivitamins, COQ 10, etc.    We ask that you do not smoke 24 hours prior to surgery  We ask that you do not  drink any alcoholic beverages 24 hours prior to surgery     You must make arrangements for a responsible adult to take you home after your surgery. For your safety you will not be allowed to leave alone or drive yourself home. Your surgery will be cancelled if you do not have a ride home. Also for your safety, it is strongly suggested that someone stay with you the first 24 hours after your surgery. A parent or legal guardian must accompany a child scheduled for surgery and plan to stay at the hospital until the child is discharged. Please do not bring other children with you. For your comfort, please wear simple loose fitting clothing to the hospital.  Please do not bring valuables. Do not wear any make-up or nail polish on your fingers or toes. For your safety, please do not wear any jewelry or body piercing's on the day of surgery. All jewelry must be removed. If you have dentures, they will be removed before going to operating room. For your convenience, we will provide you with a container. If you wear contact lenses or glasses, they will be removed, please bring a case for them. If you have a living will and a durable power of  for healthcare, please bring in a copy.      As part of our patient safety program to minimize surgical site infections, we ask you to do the following:    · Please notify your surgeon if you develop any illness between         now and the day of your surgery. · This includes a cough, cold, fever, sore throat, nausea,         or vomiting, and diarrhea, etc.  ·  Please notify your surgeon if you experience dizziness, shortness         of breath or blurred vision between now and the time of your surgery. Do not shave your operative site 96 hours prior to surgery. For face and neck surgery, men may use an electric razor 48 hours   prior to surgery. You may shower the night before surgery or the morning of   your surgery with an antibacterial soap. You will need to bring a photo ID and insurance card     If you use a C-pap or Bi-pap machine, please bring your machine with you to the hospital     Our goal is to provide you with excellent care, therefore, visitors will be limited to so that we may focus on providing this care for you. Please contact your surgeon office, if you have any further questions. [facility] phone number:  [address] PAT fax number:  [phone]    Please note these are generalized instructions for all surgical cases, you may be provided with more specific instructions according to your surgery.

## 2022-04-22 NOTE — TELEPHONE ENCOUNTER
Spoke with Orlando Urbina [facility]) regarding scheduled surgery on 05/05/2022 with Dr. John Cunha. Patient is asked to arrive by 9:10 am @ [address] after midnight. May take any Heart or Blood Pressure medication with a small sip of water to wash them down. You will need someone to drive you home following this procedure. Please bring a Photo ID & Insurance card with you, and check-in at the Surgery Desk down the right-hand hallway on the first floor. Patient has seen PCP for Pre-op H & P on 04/22/22. Surgery is scheduled to start at approx. 10:40 am and should take approx. 60 minutes. If the hospital needs any further information, someone will give you a call. Patient expressed a verbal understanding of these instructions and had no further questions at this time. Call ended.

## 2022-04-22 NOTE — FLOWSHEET NOTE
DOS 5/5/22 Dr. Vu President     Pt. Is a 44year old woman who lives in a group home. She is autistic,bipolar, nonverbal, gets around mostly in a wheel chair, can walk a little with asst., According to [facility] , the patient is \"her own POA\"  She has a sister who is blind, so Cem Andrews has the power to sign her consents. Cem Andrews will be coming with her the day of the procedure she will bring her living will. H&P To be done by Dr. Triston Bishop with [facility]. [phone]. [facility]. Understands that it needs to say \"cleared for surgery. \" Need to see if medically cleared and if needs any other clearance-Dr. Gabriel's orders say, \" additional clearance to be determined by PCP. \"      UPDATE: per Sp Sky okay to h.p scanned in on 4/26 under [facility] from [facility] [phone]

## 2022-05-04 ENCOUNTER — ANESTHESIA EVENT (OUTPATIENT)
Dept: OPERATING ROOM | Age: 37
End: 2022-05-04
Payer: MEDICARE

## 2022-05-04 NOTE — PROGRESS NOTES
Brigitte hernandez-caregiver for patient called pat for review of pre-op instructions-pre-op instructions reviewed with selena and selena verb understanding.  Brigitte Aiken bringing copy of h&p done by dr. Moses Villafana on 4/22/2022

## 2022-05-05 ENCOUNTER — HOSPITAL ENCOUNTER (OUTPATIENT)
Age: 37
Setting detail: OUTPATIENT SURGERY
Discharge: OTHER INSTITUTION | End: 2022-05-05
Attending: SURGERY | Admitting: SURGERY
Payer: MEDICARE

## 2022-05-05 ENCOUNTER — ANESTHESIA (OUTPATIENT)
Dept: OPERATING ROOM | Age: 37
End: 2022-05-05
Payer: MEDICARE

## 2022-05-05 VITALS
OXYGEN SATURATION: 96 % | SYSTOLIC BLOOD PRESSURE: 89 MMHG | RESPIRATION RATE: 6 BRPM | DIASTOLIC BLOOD PRESSURE: 53 MMHG

## 2022-05-05 VITALS
OXYGEN SATURATION: 99 % | RESPIRATION RATE: 16 BRPM | DIASTOLIC BLOOD PRESSURE: 60 MMHG | TEMPERATURE: 96.9 F | BODY MASS INDEX: 32.14 KG/M2 | WEIGHT: 200 LBS | HEART RATE: 72 BPM | HEIGHT: 66 IN | SYSTOLIC BLOOD PRESSURE: 124 MMHG

## 2022-05-05 DIAGNOSIS — N63.20 LEFT BREAST MASS: ICD-10-CM

## 2022-05-05 PROCEDURE — 3700000000 HC ANESTHESIA ATTENDED CARE: Performed by: SURGERY

## 2022-05-05 PROCEDURE — 3600000012 HC SURGERY LEVEL 2 ADDTL 15MIN: Performed by: SURGERY

## 2022-05-05 PROCEDURE — 7100000011 HC PHASE II RECOVERY - ADDTL 15 MIN: Performed by: SURGERY

## 2022-05-05 PROCEDURE — 2500000003 HC RX 250 WO HCPCS: Performed by: NURSE ANESTHETIST, CERTIFIED REGISTERED

## 2022-05-05 PROCEDURE — 2500000003 HC RX 250 WO HCPCS: Performed by: SURGERY

## 2022-05-05 PROCEDURE — 7100000010 HC PHASE II RECOVERY - FIRST 15 MIN: Performed by: SURGERY

## 2022-05-05 PROCEDURE — 2580000003 HC RX 258: Performed by: SURGERY

## 2022-05-05 PROCEDURE — 19120 REMOVAL OF BREAST LESION: CPT | Performed by: SURGERY

## 2022-05-05 PROCEDURE — 6360000002 HC RX W HCPCS: Performed by: NURSE ANESTHETIST, CERTIFIED REGISTERED

## 2022-05-05 PROCEDURE — 3700000001 HC ADD 15 MINUTES (ANESTHESIA): Performed by: SURGERY

## 2022-05-05 PROCEDURE — 88304 TISSUE EXAM BY PATHOLOGIST: CPT

## 2022-05-05 PROCEDURE — 6360000002 HC RX W HCPCS: Performed by: SURGERY

## 2022-05-05 PROCEDURE — 2709999900 HC NON-CHARGEABLE SUPPLY: Performed by: SURGERY

## 2022-05-05 PROCEDURE — 2580000003 HC RX 258: Performed by: ANESTHESIOLOGY

## 2022-05-05 PROCEDURE — 7100000000 HC PACU RECOVERY - FIRST 15 MIN: Performed by: SURGERY

## 2022-05-05 PROCEDURE — 3600000002 HC SURGERY LEVEL 2 BASE: Performed by: SURGERY

## 2022-05-05 PROCEDURE — A4217 STERILE WATER/SALINE, 500 ML: HCPCS | Performed by: SURGERY

## 2022-05-05 PROCEDURE — 7100000001 HC PACU RECOVERY - ADDTL 15 MIN: Performed by: SURGERY

## 2022-05-05 PROCEDURE — 6370000000 HC RX 637 (ALT 250 FOR IP): Performed by: SURGERY

## 2022-05-05 RX ORDER — SODIUM CHLORIDE 0.9 % (FLUSH) 0.9 %
5-40 SYRINGE (ML) INJECTION EVERY 12 HOURS SCHEDULED
Status: DISCONTINUED | OUTPATIENT
Start: 2022-05-05 | End: 2022-05-05 | Stop reason: HOSPADM

## 2022-05-05 RX ORDER — FENTANYL CITRATE 50 UG/ML
25 INJECTION, SOLUTION INTRAMUSCULAR; INTRAVENOUS EVERY 5 MIN PRN
Status: DISCONTINUED | OUTPATIENT
Start: 2022-05-05 | End: 2022-05-05 | Stop reason: HOSPADM

## 2022-05-05 RX ORDER — DROPERIDOL 2.5 MG/ML
0.62 INJECTION, SOLUTION INTRAMUSCULAR; INTRAVENOUS
Status: DISCONTINUED | OUTPATIENT
Start: 2022-05-05 | End: 2022-05-05 | Stop reason: HOSPADM

## 2022-05-05 RX ORDER — SODIUM CHLORIDE 9 MG/ML
INJECTION, SOLUTION INTRAVENOUS PRN
Status: DISCONTINUED | OUTPATIENT
Start: 2022-05-05 | End: 2022-05-05 | Stop reason: HOSPADM

## 2022-05-05 RX ORDER — MIDAZOLAM HYDROCHLORIDE 1 MG/ML
INJECTION INTRAMUSCULAR; INTRAVENOUS PRN
Status: DISCONTINUED | OUTPATIENT
Start: 2022-05-05 | End: 2022-05-05 | Stop reason: SDUPTHER

## 2022-05-05 RX ORDER — MAGNESIUM HYDROXIDE 1200 MG/15ML
LIQUID ORAL CONTINUOUS PRN
Status: DISCONTINUED | OUTPATIENT
Start: 2022-05-05 | End: 2022-05-05 | Stop reason: HOSPADM

## 2022-05-05 RX ORDER — FENTANYL CITRATE 50 UG/ML
50 INJECTION, SOLUTION INTRAMUSCULAR; INTRAVENOUS EVERY 5 MIN PRN
Status: DISCONTINUED | OUTPATIENT
Start: 2022-05-05 | End: 2022-05-05 | Stop reason: HOSPADM

## 2022-05-05 RX ORDER — SODIUM CHLORIDE 0.9 % (FLUSH) 0.9 %
5-40 SYRINGE (ML) INJECTION PRN
Status: DISCONTINUED | OUTPATIENT
Start: 2022-05-05 | End: 2022-05-05 | Stop reason: HOSPADM

## 2022-05-05 RX ORDER — PROPOFOL 10 MG/ML
INJECTION, EMULSION INTRAVENOUS PRN
Status: DISCONTINUED | OUTPATIENT
Start: 2022-05-05 | End: 2022-05-05

## 2022-05-05 RX ORDER — FENTANYL CITRATE 50 UG/ML
INJECTION, SOLUTION INTRAMUSCULAR; INTRAVENOUS PRN
Status: DISCONTINUED | OUTPATIENT
Start: 2022-05-05 | End: 2022-05-05 | Stop reason: SDUPTHER

## 2022-05-05 RX ORDER — ONDANSETRON 2 MG/ML
INJECTION INTRAMUSCULAR; INTRAVENOUS PRN
Status: DISCONTINUED | OUTPATIENT
Start: 2022-05-05 | End: 2022-05-05 | Stop reason: SDUPTHER

## 2022-05-05 RX ORDER — ACETAMINOPHEN 325 MG/1
650 TABLET ORAL
Status: COMPLETED | OUTPATIENT
Start: 2022-05-05 | End: 2022-05-05

## 2022-05-05 RX ORDER — SODIUM CHLORIDE 9 MG/ML
INJECTION, SOLUTION INTRAVENOUS CONTINUOUS
Status: DISCONTINUED | OUTPATIENT
Start: 2022-05-05 | End: 2022-05-05 | Stop reason: HOSPADM

## 2022-05-05 RX ORDER — ONDANSETRON 2 MG/ML
4 INJECTION INTRAMUSCULAR; INTRAVENOUS
Status: DISCONTINUED | OUTPATIENT
Start: 2022-05-05 | End: 2022-05-05 | Stop reason: HOSPADM

## 2022-05-05 RX ORDER — BUPIVACAINE HYDROCHLORIDE 5 MG/ML
INJECTION, SOLUTION EPIDURAL; INTRACAUDAL
Status: COMPLETED | OUTPATIENT
Start: 2022-05-05 | End: 2022-05-05

## 2022-05-05 RX ORDER — PROPOFOL 10 MG/ML
INJECTION, EMULSION INTRAVENOUS PRN
Status: DISCONTINUED | OUTPATIENT
Start: 2022-05-05 | End: 2022-05-05 | Stop reason: SDUPTHER

## 2022-05-05 RX ORDER — LIDOCAINE HYDROCHLORIDE 20 MG/ML
INJECTION, SOLUTION INFILTRATION; PERINEURAL PRN
Status: DISCONTINUED | OUTPATIENT
Start: 2022-05-05 | End: 2022-05-05 | Stop reason: SDUPTHER

## 2022-05-05 RX ORDER — DEXAMETHASONE SODIUM PHOSPHATE 4 MG/ML
INJECTION, SOLUTION INTRA-ARTICULAR; INTRALESIONAL; INTRAMUSCULAR; INTRAVENOUS; SOFT TISSUE PRN
Status: DISCONTINUED | OUTPATIENT
Start: 2022-05-05 | End: 2022-05-05 | Stop reason: SDUPTHER

## 2022-05-05 RX ADMIN — DEXAMETHASONE SODIUM PHOSPHATE 8 MG: 4 INJECTION, SOLUTION INTRAMUSCULAR; INTRAVENOUS at 11:19

## 2022-05-05 RX ADMIN — MIDAZOLAM 2 MG: 1 INJECTION INTRAMUSCULAR; INTRAVENOUS at 11:05

## 2022-05-05 RX ADMIN — SODIUM CHLORIDE: 9 INJECTION, SOLUTION INTRAVENOUS at 10:24

## 2022-05-05 RX ADMIN — FENTANYL CITRATE 100 MCG: 50 INJECTION INTRAMUSCULAR; INTRAVENOUS at 11:11

## 2022-05-05 RX ADMIN — ACETAMINOPHEN 650 MG: 325 TABLET ORAL at 10:03

## 2022-05-05 RX ADMIN — LIDOCAINE HYDROCHLORIDE 80 MG: 20 INJECTION, SOLUTION INFILTRATION; PERINEURAL at 11:11

## 2022-05-05 RX ADMIN — ONDANSETRON 4 MG: 2 INJECTION INTRAMUSCULAR; INTRAVENOUS at 11:19

## 2022-05-05 RX ADMIN — PROPOFOL 200 MG: 10 INJECTION, EMULSION INTRAVENOUS at 11:11

## 2022-05-05 RX ADMIN — CEFAZOLIN 2000 MG: 10 INJECTION, POWDER, FOR SOLUTION INTRAVENOUS at 11:20

## 2022-05-05 ASSESSMENT — PULMONARY FUNCTION TESTS
PIF_VALUE: 13
PIF_VALUE: 2
PIF_VALUE: 13
PIF_VALUE: 12
PIF_VALUE: 12
PIF_VALUE: 13
PIF_VALUE: 1
PIF_VALUE: 14
PIF_VALUE: 13
PIF_VALUE: 12
PIF_VALUE: 14
PIF_VALUE: 0
PIF_VALUE: 13
PIF_VALUE: 15
PIF_VALUE: 3
PIF_VALUE: 14
PIF_VALUE: 13
PIF_VALUE: 2
PIF_VALUE: 3
PIF_VALUE: 14
PIF_VALUE: 12
PIF_VALUE: 15
PIF_VALUE: 0
PIF_VALUE: 13
PIF_VALUE: 12
PIF_VALUE: 1
PIF_VALUE: 13
PIF_VALUE: 2
PIF_VALUE: 13
PIF_VALUE: 13
PIF_VALUE: 17
PIF_VALUE: 1

## 2022-05-05 ASSESSMENT — PAIN - FUNCTIONAL ASSESSMENT: PAIN_FUNCTIONAL_ASSESSMENT: FACE, LEGS, ACTIVITY, CRY, AND CONSOLABILITY (FLACC)

## 2022-05-05 NOTE — ANESTHESIA PRE PROCEDURE
Department of Anesthesiology  Preprocedure Note       Name:  Brittney Costello   Age:  44 y.o.  :  1985                                          MRN:  [id number]         Date:  2022      Surgeon: Kae Mills): Laura Sosa MD    Procedure: Procedure(s):  EXCISION OF LEFT BREAST MASS    Medications prior to admission:   Prior to Admission medications    Medication Sig Start Date End Date Taking? Authorizing Provider   clotrimazole (LOTRIMIN) 1 % cream Apply topically 2 times daily Indications: for 2 weeks to affected areas Apply topically 2 times daily. Yes Historical Provider, MD   fluticasone (FLONASE) 50 MCG/ACT nasal spray 1 spray by Each Nostril route daily   Yes Historical Provider, MD   Fexofenadine-Pseudoephedrine (ALLEGRA-D PO) Take by mouth   Yes Historical Provider, MD   Docusate Sodium (COLACE PO) Take 100 mg by mouth daily Indications: SOFTGELS   Yes Historical Provider, MD   famotidine (PEPCID) 20 MG tablet Take 20 mg by mouth daily   Yes Historical Provider, MD   fluvoxaMINE (LUVOX) 50 MG tablet Take 50 mg by mouth 2 times daily   Yes Historical Provider, MD   meloxicam (MOBIC) 7.5 MG tablet Take 7.5 mg by mouth daily   Yes Historical Provider, MD   propranolol (INDERAL) 40 MG tablet Take 40 mg by mouth 3 times daily   Yes Historical Provider, MD   NONFORMULARY in the morning and at bedtime 5000 [address]   Yes Historical Provider, MD   traZODone (DESYREL) 50 MG tablet Take 50 mg by mouth nightly   Yes Historical Provider, MD   vitamin B-12 (CYANOCOBALAMIN) 1000 MCG tablet Take 1,000 mcg by mouth daily   Yes Historical Provider, MD   Cholecalciferol (VITAMIN D3) 50 MCG ( UT) TABS Take by mouth   Yes Historical Provider, MD   bacitracin 500 UNIT/GM ointment Apply topically as needed Indications: TO CLEAN MINOR CUTS Apply topically 2 times daily.    Yes Historical Provider, MD   carbamide peroxide (DEBROX) 6.5 % otic solution 5 drops 2 times daily Indications: TO AFFECTED EAR TWICE DAILY   Yes Historical Provider, MD   Dextromethorphan-guaiFENesin ([place] DM PO) Take by mouth   Yes Historical Provider, MD   nystatin (MYCOSTATIN) POWD powder Apply topically 2 times daily Indications: AS NEEDED UNDER ARMS   Yes Historical Provider, MD   nystatin (MYCOSTATIN) 348878 UNIT/GM ointment Apply topically 2 times daily Apply topically 2 times daily. Yes Historical Provider, MD   Phenol-Glycerin (CHLORASEPTIC MAX SORE THROAT MT) Take by mouth Indications: MOUTH SPRAY   Yes Historical Provider, MD   norgestrel-ethinyl estradiol (LOW-OGESTREL) 0.3-30 MG-MCG per tablet Take 1 tablet by mouth daily    Historical Provider, MD   FLUoxetine (PROZAC) 20 MG capsule Take 20 mg by mouth daily    Historical Provider, MD   valACYclovir (VALTREX) 1 G tablet Take 1,000 mg by mouth as needed    Historical Provider, MD   polyethylene glycol (GLYCOLAX) packet Take 17 g by mouth daily as needed. Historical Provider, MD   acetaminophen (TYLENOL) 325 MG tablet Take 650 mg by mouth every 4 hours as needed for Pain. Historical Provider, MD   ibuprofen (ADVIL;MOTRIN) 400 MG tablet Take 400 mg by mouth every 6 hours as needed for Pain. Historical Provider, MD   ferrous sulfate 325 (65 FE) MG tablet Take 325 mg by mouth daily (with breakfast). Historical Provider, MD   OLANZapine (ZYPREXA) 10 MG tablet Take 10 mg by mouth 2 times daily. Historical Provider, MD   magnesium hydroxide (MILK OF MAGNESIA) 400 MG/5ML suspension Take  by mouth daily as needed. Historical Provider, MD   loperamide (IMODIUM) 2 MG capsule Take 2 mg by mouth every 6 hours as needed. Historical Provider, MD   EPINEPHrine HCl, Anaphylaxis, (EPIPEN IM) Inject  into the muscle. Historical Provider, MD   LORazepam (ATIVAN) 1 MG tablet Take 0.5 mg by mouth 2 times daily     Historical Provider, MD   prenatal vitamin (PRENATAL-S) 27-0.8 MG TABS Take 1 tablet by mouth daily.       Historical Provider, MD   loratadine (CLARITIN) 10 MG tablet Take 10 mg by mouth daily. Historical Provider, MD   topiramate (TOPAMAX) 25 MG capsule Take 25 mg by mouth 2 times daily Indications: BEFORE MEALS     Historical Provider, MD       Current medications:    Current Facility-Administered Medications   Medication Dose Route Frequency Provider Last Rate Last Admin    0.9 % sodium chloride infusion   IntraVENous Continuous Quintin Daniel MD        sodium chloride flush 0.9 % injection 5-40 mL  5-40 mL IntraVENous 2 times per day Quintin Daniel MD        sodium chloride flush 0.9 % injection 5-40 mL  5-40 mL IntraVENous PRN Quintin Daniel MD        0.9 % sodium chloride infusion   IntraVENous PRN Quintin Daniel MD        ceFAZolin (ANCEF) 2000 mg in dextrose 5 % 100 mL IVPB  2,000 mg IntraVENous Once Haresh Yan MD        acetaminophen (TYLENOL) tablet 650 mg  650 mg Oral 61 Min Pre-Op Haresh Yan MD           Allergies: Allergies   Allergen Reactions    Bee Venom      DOES NOT REMEMBER    Codeine      DOES NOT REMEMBER DETAILS       Problem List:  There is no problem list on file for this patient. 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     Autism 2007    described as \"childlike\" nonverbal, does NOT sign her own consent.      Bipolar 1 disorder (HCC)     Chewing difficulty     cut meat up to bite size     Depression     Esophagitis     Gastric ulcer     GERD (gastroesophageal reflux disease)     History of ear infections     Insomnia     Neuroblastoma ([address] 1984    as infant    OCD (obsessive compulsive disorder)        Past Surgical History:        Procedure Laterality Date    DENTAL SURGERY  2009    EAR SURGERY Right 2012    excision of mass of head /neck/ right ear    ENDOSCOPY, COLON, DIAGNOSTIC Social History:    Social History     Tobacco Use    Smoking status: Never Smoker    Smokeless tobacco: Never Used   Substance Use Topics    Alcohol use: Never                                Counseling given: Not Answered      Vital Signs (Current):   Vitals:    04/22/22 1539 05/05/22 0945   BP:  (!) 134/98   Pulse:  80   Resp:  18   Temp:  97.5 °F (36.4 °C)   TempSrc:  Temporal   SpO2:  97%   Weight: 200 lb (90.7 kg) 200 lb (90.7 kg)   Height: 5' 6\" (1.676 m) 5' 6\" (1.676 m)                                              BP Readings from Last 3 Encounters:   05/05/22 (!) 134/98   04/13/22 (!) 126/93   02/18/22 116/79       NPO Status: Time of last liquid consumption: 0000                        Time of last solid consumption: 0000                        Date of last liquid consumption: 05/05/22                        Date of last solid food consumption: 05/05/22    BMI:   Wt Readings from Last 3 Encounters:   05/05/22 200 lb (90.7 kg)   04/13/22 209 lb 9.6 oz (95.1 kg)   02/18/22 203 lb (92.1 kg)     Body mass index is 32.28 kg/m². CBC: No results found for: WBC, RBC, HGB, HCT, MCV, RDW, PLT    CMP: No results found for: NA, K, CL, CO2, BUN, CREATININE, GFRAA, AGRATIO, LABGLOM, GLUCOSE, GLU, PROT, CALCIUM, BILITOT, ALKPHOS, AST, ALT    POC Tests: No results for input(s): POCGLU, POCNA, POCK, POCCL, POCBUN, POCHEMO, POCHCT in the last 72 hours.     Coags: No results found for: PROTIME, INR, APTT    HCG (If Applicable): No results found for: PREGTESTUR, PREGSERUM, HCG, HCGQUANT     ABGs: No results found for: PHART, PO2ART, SVL4EUD, FJU1IDO, BEART, Q7SXCSBX     Type & Screen (If Applicable):  No results found for: LABABO, LABRH    Drug/Infectious Status (If Applicable):  No results found for: HIV, HEPCAB    COVID-19 Screening (If Applicable):   Lab Results   Component Value Date    COVID19 DETECTED 08/22/2020           Anesthesia Evaluation  Patient summary reviewed no history of anesthetic complications: Airway: Mallampati: Unable to assess / NA  TM distance: <3 FB   Neck ROM: full  Mouth opening: < 3 FB Dental:          Pulmonary:Negative Pulmonary ROS                              Cardiovascular:  Exercise tolerance: good (>4 METS),       (-) past MI and CAD                Neuro/Psych:   (+) psychiatric history:depression/anxiety              ROS comment: Developmental delay GI/Hepatic/Renal:   (+) GERD: well controlled, PUD,           Endo/Other:                     Abdominal:             Vascular: negative vascular ROS. Other Findings:           Anesthesia Plan      general     ASA 3     (General with LMA, PIV, PACU. I discussed with the patient the risks and benefits to general anesthesia including possible anesthetic complications but not limited to: PONV, damage to the airway and surrounding structures (teeth, lips, gums, tongue, etc.), adverse reactions to medications, cardiac complications (MI, CHF, arrhythmias, etc.), respiratory complications (post-op ventilation, respiratory failure, etc.), neurologic complications (nerve damage, stroke, seizure), the potential for conversion to a general anesthetic, and death. The patient was given the opportunity to ask questions and all questions were answered to the patient's satisfaction.  )  Induction: intravenous. MIPS: Postoperative opioids intended and Prophylactic antiemetics administered. Anesthetic plan and risks discussed with patient and legal guardian. Plan discussed with CRNA. This pre-anesthesia assessment may be used as a history and physical.    DOS STAFF ADDENDUM:    Pt seen and examined, chart reviewed (including anesthesia, drug and allergy history). No interval changes to history and physical examination. Anesthetic plan, risks, benefits, alternatives, and personnel involved discussed with patient. Patient verbalized an understanding and agrees to proceed.       Kermit Dubin, MD  May 5, 2022  10:09 AM

## 2022-05-05 NOTE — H&P
Update History & Physical    The patient's History and Physical of May 5, 2022 was reviewed with the patient and I examined the patient. There was no change. The surgical site was confirmed by the patient and me. Plan: The risks, benefits, expected outcome, and alternative to the recommended procedure have been discussed with the patient. Patient understands and wants to proceed with the procedure.      Electronically signed by Darel Soulier, MD on 5/5/2022 at 10:40 AM

## 2022-05-05 NOTE — OP NOTE
Operative Note      Patient: Millicent Rocha  YOB: 1985  MRN: [id number]    Date of Procedure: 5/5/2022    Pre-Op Diagnosis: LEFT BREAST MASS    Post-Op Diagnosis: Same       Procedure(s):  EXCISION OF LEFT BREAST MASS    Surgeon(s): Steffi Gazra MD    Assistant:   Surgical Assistant: Dewey Dias    Anesthesia: Monitor Anesthesia Care    Estimated Blood Loss (mL): Minimal    Complications: None    Specimens:   ID Type Source Tests Collected by Time Destination   A : A) Left breast mass Tissue Breast SURGICAL PATHOLOGY Steffi Garza MD 5/5/2022 1128        Implants:  * No implants in log *      Drains: * No LDAs found *    Findings: see op note    Detailed Description of Procedure:   Operative Report      Name:  Millicent Rocha   MRN:  [id number]  Date:  5/5/2022        PREOPERATIVE DIAGNOSIS: left breast mass    POSTOPERATIVE DIAGNOSIS: same    PROCEDURE:left breast excisional biopsy    SURGEON: Harvey    ESTIMATED BLOOD LOSS:  Less than 25 mL    ASSISTANT: Alyssa PA    ANESTHESIA: General    INDICATIONS FOR PROCEDURE: The patient is a 44 y.o. female who had  presented with a palpable left breast mass. It has been inflamed in the past. She is here now for excision of the mass. The risks, benefits and alternatives were discussed with the  patient. Questions were answered and she is agreeable to proceed. Ms. Khloe Chris was met by me in the preoperative area. The surgical sites were identified. Consent was obtained. The appropriate breast imaging was reviewed. DESCRIPTION OF OPERATION: The patient was brought to the operating room and  placed on the OR table in the supine position. She was given general anesthesia and the left breast was prepped and draped in the usual sterile fashion. The area was injected with local anesthetic.  A 4 x 2 cm oblique elliptical incision was made in the inner portion on the left breast around the mass and carried down through the skin and subcutaneous tissues. The mass was dissected free from the surrounding tissue using cautery and sent to pathology for permanent section. The wound was irrigated and injected with local anesthetic, and  closed with a deep layer of vicryl and skin was re-approximated with 4-0 Monocryl and covered with surgical glue. Patient was placed into a surgical bra. Sponge, needle and instrument counts were correct per nursing. The patient tolerated the procedure well. She was taken to the  recovery room in stable condition.       Electronically signed by Florina Yao MD on 5/5/2022 at 11:45 AM

## 2022-05-05 NOTE — ANESTHESIA POSTPROCEDURE EVALUATION
Department of Anesthesiology  Postprocedure Note    Patient: Charbel Murphy  MRN: [id number]  YOB: 1985  Date of evaluation: 5/5/2022  Time:  1:26 PM     Procedure Summary     Date: 05/05/22 Room / Location: [address] [place] [address]    Anesthesia Start: 1108 Anesthesia Stop:     Procedure: EXCISION OF LEFT BREAST MASS (Left Breast) Diagnosis:       Left breast mass      (LEFT BREAST MASS)    Surgeons: Ronn Ortiz MD Responsible Provider: Jaylan Swain MD    Anesthesia Type: general ASA Status: 3          Anesthesia Type: No value filed. Kieran Phase I: Kieran Score: 9    Kieran Phase II: Kieran Score: 10    Last vitals: Reviewed and per EMR flowsheets.        Anesthesia Post Evaluation    Patient location during evaluation: PACU  Patient participation: complete - patient participated  Level of consciousness: awake  Airway patency: patent  Nausea & Vomiting: no nausea and no vomiting  Cardiovascular status: blood pressure returned to baseline  Respiratory status: acceptable  Hydration status: stable  Multimodal analgesia pain management approach

## 2022-05-05 NOTE — PROGRESS NOTES
Patient to pacu from OR 4, vss on room air, patient resting comfortably, will continue to monitor. 1235: Patient's vitals remained stable throughout pacu stay.

## 2022-05-05 NOTE — PROGRESS NOTES
Back to room and sitting in her wheelchair. Discharge instructions reviewed with caregiver and pt. Henrileora Aiken (caregiver) expresses an understanding of instructions. Assisted pt to dress. Wheeled by caregiver to car for discharge.

## 2022-05-05 NOTE — PROGRESS NOTES
Pt alert on arrival to phase II. Pt verbalizes few words, \"Bye, morning. \" no sign of pain at present. VSS. Given soda and crackers. Caregiver present. Call light within reach.

## 2022-06-01 ENCOUNTER — OFFICE VISIT (OUTPATIENT)
Dept: BREAST CENTER | Age: 37
End: 2022-06-01

## 2022-06-01 DIAGNOSIS — N63.20 LEFT BREAST MASS: Primary | ICD-10-CM

## 2022-06-01 PROCEDURE — 99024 POSTOP FOLLOW-UP VISIT: CPT | Performed by: SURGERY

## 2022-06-01 NOTE — PROGRESS NOTES
Subjective:      Patient ID: Ebonie Saucedo is a 44 y.o. female. HPI    Chief Complaint   Patient presents with    Post-Op Check     Patient presents post op left breast excisional biopsy     Patient is s/p excision of left breast skin cyst 5/5/2022. Wound healing well, no pain or tenderness. Follow up as needed. 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     Autism 2007    described as \"childlike\" nonverbal, does NOT sign her own consent.  Bipolar 1 disorder (HCC)     Chewing difficulty     cut meat up to bite size     Depression     Esophagitis     Gastric ulcer     GERD (gastroesophageal reflux disease)     History of ear infections     Insomnia     Neuroblastoma ([address] 1984    as infant    OCD (obsessive compulsive disorder)        Past Surgical History:   Procedure Laterality Date    BREAST SURGERY Left 5/5/2022    EXCISION OF LEFT BREAST MASS performed by Mc Neal [facility] at [facility]  2009    EAR SURGERY Right 2012    excision of mass of head /neck/ right ear    ENDOSCOPY, COLON, DIAGNOSTIC         Current Outpatient Medications   Medication Sig Dispense Refill    clotrimazole (LOTRIMIN) 1 % cream Apply topically 2 times daily Indications: for 2 weeks to affected areas Apply topically 2 times daily.       fluticasone (FLONASE) 50 MCG/ACT nasal spray 1 spray by Each Nostril route daily      Fexofenadine-Pseudoephedrine (ALLEGRA-D PO) Take by mouth      Docusate Sodium (COLACE PO) Take 100 mg by mouth daily Indications: SOFTGELS      famotidine (PEPCID) 20 MG tablet Take 20 mg by mouth daily      fluvoxaMINE (LUVOX) 50 MG tablet Take 50 mg by mouth 2 times daily      meloxicam (MOBIC) 7.5 MG tablet Take 7.5 mg by mouth daily      propranolol (INDERAL) 40 MG tablet Take 40 mg by mouth 3 times daily      NONFORMULARY in the morning and at bedtime 5000 PLUS DENTAL CREAM      traZODone (DESYREL) 50 MG tablet Take 50 mg by mouth nightly      vitamin B-12 (CYANOCOBALAMIN) 1000 MCG tablet Take 1,000 mcg by mouth daily      Cholecalciferol (VITAMIN D3) 50 MCG (2000 UT) TABS Take by mouth      bacitracin 500 UNIT/GM ointment Apply topically as needed Indications: TO CLEAN MINOR CUTS Apply topically 2 times daily.  carbamide peroxide (DEBROX) 6.5 % otic solution 5 drops 2 times daily Indications: TO AFFECTED EAR TWICE DAILY      Dextromethorphan-guaiFENesin ([place]UC[place] DM PO) Take by mouth      nystatin (MYCOSTATIN) POWD powder Apply topically 2 times daily Indications: AS NEEDED UNDER ARMS      nystatin (MYCOSTATIN) 462777 UNIT/GM ointment Apply topically 2 times daily Apply topically 2 times daily.  Phenol-Glycerin (CHLORASEPTIC MAX SORE THROAT MT) Take by mouth Indications: MOUTH SPRAY      norgestrel-ethinyl estradiol (LOW-OGESTREL) 0.3-30 MG-MCG per tablet Take 1 tablet by mouth daily      FLUoxetine (PROZAC) 20 MG capsule Take 20 mg by mouth daily      valACYclovir (VALTREX) 1 G tablet Take 1,000 mg by mouth as needed      polyethylene glycol (GLYCOLAX) packet Take 17 g by mouth daily as needed.  acetaminophen (TYLENOL) 325 MG tablet Take 650 mg by mouth every 4 hours as needed for Pain.  ibuprofen (ADVIL;MOTRIN) 400 MG tablet Take 400 mg by mouth every 6 hours as needed for Pain.  ferrous sulfate 325 (65 FE) MG tablet Take 325 mg by mouth daily (with breakfast).  OLANZapine (ZYPREXA) 10 MG tablet Take 10 mg by mouth 2 times daily.  magnesium hydroxide (MILK OF MAGNESIA) 400 MG/5ML suspension Take  by mouth daily as needed.  loperamide (IMODIUM) 2 MG capsule Take 2 mg by mouth every 6 hours as needed.  EPINEPHrine HCl, Anaphylaxis, (EPIPEN IM) Inject  into the muscle.         LORazepam (ATIVAN) 1 MG tablet Take 0.5 mg by mouth 2 times daily       prenatal vitamin (PRENATAL-S) 27-0.8 MG TABS Take 1 tablet by mouth daily.  loratadine (CLARITIN) 10 MG tablet Take 10 mg by mouth daily.  topiramate (TOPAMAX) 25 MG capsule Take 25 mg by mouth 2 times daily Indications: BEFORE MEALS        No current facility-administered medications for this visit. Social History     Socioeconomic History    Marital status: Single     Spouse name: Not on file    Number of children: Not on file    Years of education: Not on file    Highest education level: Not on file   Occupational History    Not on file   Tobacco Use    Smoking status: Never Smoker    Smokeless tobacco: Never Used   Vaping Use    Vaping Use: Never used   Substance and Sexual Activity    Alcohol use: Never    Drug use: No    Sexual activity: Never   Other Topics Concern    Not on file   Social History Narrative    Not on file     Social Determinants of Health     Financial Resource Strain:     Difficulty of Paying Living Expenses: Not on file   Food Insecurity:     Worried About Running Out of Food in the Last Year: Not on file    [place] of Food in the Last Year: Not on file   Transportation Needs:     Lack of Transportation (Medical): Not on file    Lack of Transportation (Non-Medical):  Not on file   Physical Activity:     Days of Exercise per Week: Not on file    Minutes of Exercise per Session: Not on file   Stress:     Feeling of Stress : Not on file   Social Connections:     Frequency of Communication with Friends and Family: Not on file    Frequency of Social Gatherings with Friends and Family: Not on file    Attends Jainism Services: Not on file    Active Member of Clubs or Organizations: Not on file    Attends Club or Organization Meetings: Not on file    Marital Status: Not on file   Intimate Partner Violence:     Fear of Current or Ex-Partner: Not on file    Emotionally Abused: Not on file    Physically Abused: Not on file    Sexually Abused: Not on file Housing Stability:     Unable to Pay for Housing in the Last Year: Not on file    Number of Places Lived in the Last Year: Not on file    Unstable Housing in the Last Year: Not on file       Objective:   Physical Exam        Assessment:      Diagnosis Orders   1.  Left breast mass            Plan:     See above       Electronically signed by Zuleika Pierson MD on 6/1/2022 at 11:21 AM

## 2022-06-01 NOTE — PATIENT INSTRUCTIONS
Biopsy results were discussed with aide  Incision has healed well  Nursing services may be discontinued per Dr Butch Soliman  Call office for any further questions.

## 2023-03-21 ENCOUNTER — ANESTHESIA EVENT (OUTPATIENT)
Dept: ENDOSCOPY | Age: 38
End: 2023-03-21
Payer: MEDICARE

## 2023-03-22 RX ORDER — NEOMYCIN SULFATE, POLYMYXIN B SULFATE, BACITRACIN ZINC, HYDROCORTISONE 3.5; 10000; 400; 1 MG/G; [USP'U]/G; [USP'U]/G; MG/G
OINTMENT OPHTHALMIC AS NEEDED
COMMUNITY

## 2023-03-22 RX ORDER — POLYETHYLENE GLYCOL-3350 AND ELECTROLYTES WITH FLAVOR PACK 240; 5.84; 2.98; 6.72; 22.72 G/278.26G; G/278.26G; G/278.26G; G/278.26G; G/278.26G
4000 POWDER, FOR SOLUTION ORAL
COMMUNITY

## 2023-03-22 RX ORDER — AMOXICILLIN 250 MG
1 CAPSULE ORAL DAILY
COMMUNITY

## 2023-03-22 RX ORDER — MELOXICAM 7.5 MG/1
7.5 TABLET ORAL DAILY
COMMUNITY

## 2023-03-22 RX ORDER — IBUPROFEN 800 MG/1
800 TABLET ORAL EVERY 8 HOURS PRN
COMMUNITY

## 2023-03-22 RX ORDER — DIVALPROEX SODIUM 125 MG/1
125 TABLET, DELAYED RELEASE ORAL 3 TIMES DAILY
COMMUNITY

## 2023-03-27 ENCOUNTER — ANESTHESIA (OUTPATIENT)
Dept: ENDOSCOPY | Age: 38
End: 2023-03-27
Payer: MEDICARE

## 2023-03-27 ENCOUNTER — HOSPITAL ENCOUNTER (OUTPATIENT)
Age: 38
Setting detail: OUTPATIENT SURGERY
Discharge: HOME | End: 2023-03-27
Attending: INTERNAL MEDICINE | Admitting: INTERNAL MEDICINE
Payer: MEDICARE

## 2023-03-27 VITALS
OXYGEN SATURATION: 97 % | DIASTOLIC BLOOD PRESSURE: 80 MMHG | HEART RATE: 82 BPM | TEMPERATURE: 97 F | WEIGHT: 200 LBS | RESPIRATION RATE: 18 BRPM | SYSTOLIC BLOOD PRESSURE: 134 MMHG | BODY MASS INDEX: 32.14 KG/M2 | HEIGHT: 66 IN

## 2023-03-27 DIAGNOSIS — Z87.11 HISTORY OF GASTRIC ULCER: ICD-10-CM

## 2023-03-27 DIAGNOSIS — K62.5 RECTAL BLEEDING: ICD-10-CM

## 2023-03-27 LAB — HCG SERPL QL: NEGATIVE

## 2023-03-27 PROCEDURE — 7100000000 HC PACU RECOVERY - FIRST 15 MIN: Performed by: INTERNAL MEDICINE

## 2023-03-27 PROCEDURE — 7100000001 HC PACU RECOVERY - ADDTL 15 MIN: Performed by: INTERNAL MEDICINE

## 2023-03-27 PROCEDURE — 2580000003 HC RX 258: Performed by: NURSE ANESTHETIST, CERTIFIED REGISTERED

## 2023-03-27 PROCEDURE — 36415 COLL VENOUS BLD VENIPUNCTURE: CPT

## 2023-03-27 PROCEDURE — 2500000003 HC RX 250 WO HCPCS: Performed by: NURSE ANESTHETIST, CERTIFIED REGISTERED

## 2023-03-27 PROCEDURE — 84703 CHORIONIC GONADOTROPIN ASSAY: CPT

## 2023-03-27 PROCEDURE — 3609010300 HC COLONOSCOPY W/BIOPSY SINGLE/MULTIPLE: Performed by: INTERNAL MEDICINE

## 2023-03-27 PROCEDURE — 7100000011 HC PHASE II RECOVERY - ADDTL 15 MIN: Performed by: INTERNAL MEDICINE

## 2023-03-27 PROCEDURE — 88305 TISSUE EXAM BY PATHOLOGIST: CPT

## 2023-03-27 PROCEDURE — 7100000010 HC PHASE II RECOVERY - FIRST 15 MIN: Performed by: INTERNAL MEDICINE

## 2023-03-27 PROCEDURE — 6360000002 HC RX W HCPCS: Performed by: NURSE ANESTHETIST, CERTIFIED REGISTERED

## 2023-03-27 PROCEDURE — 2709999900 HC NON-CHARGEABLE SUPPLY: Performed by: INTERNAL MEDICINE

## 2023-03-27 PROCEDURE — 3609012400 HC EGD TRANSORAL BIOPSY SINGLE/MULTIPLE: Performed by: INTERNAL MEDICINE

## 2023-03-27 PROCEDURE — 3700000001 HC ADD 15 MINUTES (ANESTHESIA): Performed by: INTERNAL MEDICINE

## 2023-03-27 PROCEDURE — 3700000000 HC ANESTHESIA ATTENDED CARE: Performed by: INTERNAL MEDICINE

## 2023-03-27 RX ORDER — PROPOFOL 10 MG/ML
INJECTION, EMULSION INTRAVENOUS PRN
Status: DISCONTINUED | OUTPATIENT
Start: 2023-03-27 | End: 2023-03-27 | Stop reason: SDUPTHER

## 2023-03-27 RX ORDER — SODIUM CHLORIDE 9 MG/ML
INJECTION, SOLUTION INTRAVENOUS PRN
Status: DISCONTINUED | OUTPATIENT
Start: 2023-03-27 | End: 2023-03-27 | Stop reason: HOSPADM

## 2023-03-27 RX ORDER — SODIUM CHLORIDE 0.9 % (FLUSH) 0.9 %
5-40 SYRINGE (ML) INJECTION EVERY 12 HOURS SCHEDULED
Status: DISCONTINUED | OUTPATIENT
Start: 2023-03-27 | End: 2023-03-27 | Stop reason: HOSPADM

## 2023-03-27 RX ORDER — SODIUM CHLORIDE 9 MG/ML
INJECTION, SOLUTION INTRAVENOUS CONTINUOUS PRN
Status: DISCONTINUED | OUTPATIENT
Start: 2023-03-27 | End: 2023-03-27 | Stop reason: SDUPTHER

## 2023-03-27 RX ORDER — PROPOFOL 10 MG/ML
INJECTION, EMULSION INTRAVENOUS CONTINUOUS PRN
Status: DISCONTINUED | OUTPATIENT
Start: 2023-03-27 | End: 2023-03-27 | Stop reason: SDUPTHER

## 2023-03-27 RX ORDER — SODIUM CHLORIDE 0.9 % (FLUSH) 0.9 %
5-40 SYRINGE (ML) INJECTION PRN
Status: DISCONTINUED | OUTPATIENT
Start: 2023-03-27 | End: 2023-03-27 | Stop reason: HOSPADM

## 2023-03-27 RX ORDER — LIDOCAINE HYDROCHLORIDE 20 MG/ML
INJECTION, SOLUTION EPIDURAL; INFILTRATION; INTRACAUDAL; PERINEURAL PRN
Status: DISCONTINUED | OUTPATIENT
Start: 2023-03-27 | End: 2023-03-27 | Stop reason: SDUPTHER

## 2023-03-27 RX ORDER — ONDANSETRON 2 MG/ML
4 INJECTION INTRAMUSCULAR; INTRAVENOUS
Status: DISCONTINUED | OUTPATIENT
Start: 2023-03-27 | End: 2023-03-27 | Stop reason: HOSPADM

## 2023-03-27 RX ADMIN — LIDOCAINE HYDROCHLORIDE 50 MG: 20 INJECTION, SOLUTION EPIDURAL; INFILTRATION; INTRACAUDAL; PERINEURAL at 09:51

## 2023-03-27 RX ADMIN — PROPOFOL 140 MCG/KG/MIN: 10 INJECTION, EMULSION INTRAVENOUS at 09:51

## 2023-03-27 RX ADMIN — PROPOFOL 100 MG: 10 INJECTION, EMULSION INTRAVENOUS at 09:51

## 2023-03-27 RX ADMIN — SODIUM CHLORIDE: 9 INJECTION, SOLUTION INTRAVENOUS at 09:39

## 2023-03-27 ASSESSMENT — ENCOUNTER SYMPTOMS: SHORTNESS OF BREATH: 0

## 2023-03-27 ASSESSMENT — PAIN - FUNCTIONAL ASSESSMENT: PAIN_FUNCTIONAL_ASSESSMENT: ADULT NONVERBAL PAIN SCALE (NPVS)

## 2023-03-27 NOTE — DISCHARGE INSTRUCTIONS
Recommendations:   1) Await pathology results  2) Pending pathology results will discuss additional treatment options. 3) Will call group home with results/recommendations once pathology results return. Discharge Instructions for Colonoscopy     Colonoscopy is a visual exam of the lining of the large intestine, also called the bowel or colon, with a colonoscope. A colonoscope is a flexible tube with a light and a viewing device. It allows the doctor to view the inside of the colon through a tiny video camera. Colonoscopy is performed for many reasons: unexplained anemia , pain, diarrhea , bloody stools, cancer screening, among many other reasons. Complications from a colonoscopy are rare. Some possible serious complications include perforated bowel (which might require surgery) and bleeding (which could require blood transfusion ). Minor complications include bloating, gas, and cramping that can last for 1-2 days after the procedure. Because air is put into your colon during the procedure, it is normal to pass large amounts of air from your rectum. You may not have a bowel movement for 1-3 days after the procedure. What You Will Need:  Someone to drive you home after the procedure     Steps to Take:  [address] when you get home. Because the sedative will make you drowsy, don't drive, operate machinery, or make important decisions the day of the procedure. Feelings of bloating, gas, or cramping may persist for 24 hours. Diet -  Try sips of water first. If tolerated, resume bland food (scrambled eggs, toast, soup) first.  If tolerated, resume regular diet or the diet recommended by your physician. Do not drink alcohol for 24 hours. Physical Activity -  Ask your doctor when you will be able to return to work. Do not drive, operate heavy machinery, or do activities that require coordination or balance for 24 hours.    Otherwise, return to your normal routine as soon as you are

## 2023-03-27 NOTE — ANESTHESIA POSTPROCEDURE EVALUATION
INR, APTT    Intake & Output:  @[id number]@    Nausea & Vomiting:  No    Level of Consciousness:  Awake    Pain Assessment:  Adequate analgesia    Anesthesia Complications:  No apparent anesthetic complications    SUMMARY      Vital signs stable  OK to discharge from Stage I post anesthesia care.   Care transferred from Anesthesiology department on discharge from perioperative area

## 2023-03-27 NOTE — H&P
Pre-operative History and Physical    Patient: Suman Gomez  : 1985  Acct#:     Intended Procedure:  EGD/Colonoscopy    HISTORY OF PRESENT ILLNESS:  The patient is a 43 y.o. 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     Autism 2007    described as \"childlike\" nonverbal, does NOT sign her own consent. Bipolar 1 disorder (HCC)     Chewing difficulty     cut meat up to bite size     Depression     Esophagitis     Gastric ulcer     GERD (gastroesophageal reflux disease)     History of ear infections     Insomnia     Neuroblastoma ([address]     as infant    OCD (obsessive compulsive disorder)      Past Surgical History:        Procedure Laterality Date    BREAST SURGERY Left 2022    EXCISION OF LEFT BREAST MASS performed by Aadli Solano MD at [address] [place] [address]  2009    EAR SURGERY Right     excision of mass of head /neck/ right ear    ENDOSCOPY, COLON, DIAGNOSTIC       Medications Prior to Admission:   Prior to Admission medications    Medication Sig Start Date End Date Taking?  Authorizing Provider   divalproex (DEPAKOTE) 125 MG DR tablet Take 125 mg by mouth 3 times daily   Yes Historical Provider, MD   senna-docusate (Carlitos Pipes) 8.6-50 MG per tablet Take 1 tablet by mouth daily   Yes Historical Provider, MD   ibuprofen (ADVIL;MOTRIN) 800 MG tablet Take 800 mg by mouth every 8 hours as needed for Pain   Yes Historical Provider, MD   polyethylene glycol (GAVILYTE-C) 240 g solution Take 4,000 mLs by mouth   Yes Historical Provider, MD Sole HERNANDEZ-Min w/Fe-Folate-DHA (PRENATAL COMPLETE PO) Take by mouth daily   Yes Historical Provider, MD   meloxicam (MOBIC) 7.5 MG tablet Take 7.5 mg by mouth daily   Yes Historical Provider, MD   Sunscreens

## 2023-03-27 NOTE — ANESTHESIA PRE PROCEDURE
Answered      Vital Signs (Current): There were no vitals filed for this visit. BP Readings from Last 3 Encounters:   03/27/23 117/74   05/05/22 (!) 89/53   05/05/22 124/60       NPO Status:                                                                                 BMI:   Wt Readings from Last 3 Encounters:   03/27/23 200 lb (90.7 kg)   05/05/22 200 lb (90.7 kg)   04/13/22 209 lb 9.6 oz (95.1 kg)     There is no height or weight on file to calculate BMI.    CBC: No results found for: WBC, RBC, HGB, HCT, MCV, RDW, PLT    CMP: No results found for: NA, K, CL, CO2, BUN, CREATININE, GFRAA, AGRATIO, LABGLOM, GLUCOSE, GLU, PROT, CALCIUM, BILITOT, ALKPHOS, AST, ALT    POC Tests: No results for input(s): POCGLU, POCNA, POCK, POCCL, POCBUN, POCHEMO, POCHCT in the last 72 hours.     Coags: No results found for: PROTIME, INR, APTT    HCG (If Applicable): No results found for: PREGTESTUR, PREGSERUM, HCG, HCGQUANT     ABGs: No results found for: PHART, PO2ART, EVQ6IPA, XSV6JYV, BEART, P9EHKPVG     Type & Screen (If Applicable):  No results found for: LABABO, LABRH    Drug/Infectious Status (If Applicable):  No results found for: HIV, HEPCAB    COVID-19 Screening (If Applicable):   Lab Results   Component Value Date/Time    COVID19 DETECTED 08/22/2020 07:04 PM           Anesthesia Evaluation  Patient summary reviewed and Nursing notes reviewed no history of anesthetic complications:   Airway: Mallampati: Unable to assess / NA  TM distance: <3 FB   Neck ROM: full  Mouth opening: < 3 FB   Dental:          Pulmonary:Negative Pulmonary ROS       (-) COPD, asthma and shortness of breath                           Cardiovascular:  Exercise tolerance: good (>4 METS),       (-) hypertension, valvular problems/murmurs, past MI, CAD, CABG/stent, dysrhythmias,  angina and no hyperlipidemia                Neuro/Psych:   (+) psychiatric history (Autism):depression/anxiety    (-) seizures, TIA and

## 2023-03-27 NOTE — OP NOTE
Endoscopy Note    Patient: Dawna Canales  : 1985  Acct#:     Procedure: Esophagogastroduodenoscopy with biopsy                       Colonoscopy with biopsy    Date:  3/27/2023     Surgeon:   Humaira Naik MD    Referring Physician:  Nick Gordon    Previous Colonoscopy: NO  Date: N/A  Greater than 3 years: N/A    Indications: This is a 43y.o. year old female who presents today with Diarrhea/Hx Gastric ulcers/Rectal bleeding     Postoperative Diagnosis:  1. LA Grade A Esophagitis 2. Colitis-Biopsied    Anesthesia:  The patient was administered IV propofol per anesthesiology team.  Please see their operative records for full details. Consent:  The patient or their legal guardian has signed a consent, and is aware of the potential risks, benefits, alternatives, and potential complications of this procedure. These include, but are not limited to hemorrhage, bleeding, post procedural pain, perforation, phlebitis, aspiration, hypotension, hypoxia, cardiovascular events such as arryhthmia, and possibly death. Additionally, the possibility of missed colonic polyps and interval colon cancer was discussed in the consent. Description of Procedure: The patient was then taken to the endoscopy suite, placed in the left lateral decubitus position and the above IV sedation was administrered. The Olympus video endoscope was placed through the patient's oropharynx without difficulty to the extent of the 2nd portion of the duodenum. Both forward and retroflexed views of the stomach were obtained. Findings:    Esophagus: The esophagus had evidence of LA Grade A esophagitis. The esophagus otherwise appeared normal without evidence of Evans's esophagus    Stomach: The stomach appeared normal on forward and retroflexed views. Duodenum: The first and 2nd portions of the duodenum appeared normal with normal villous pattern. Biopsied to evaluate for Celiac disease.      The scope was

## 2023-03-27 NOTE — PROGRESS NOTES
Chelsie Mehta Note    DOS 3/27/23   [birth year]    Dr. Hector Baires and Dr. Maria Alejandra Salinas will do a 2 MD consent for this pt DOS. Pt does not have guardian appointed, mother has  and sister has not been in pt life in the last 3 months. RN home is working on state appointed guardianship. Pt is non verbal and does wear head gear to protect her from head banging. Lea Macario,  of [facility] will be attending with pt. DOS. Pt is active having blood in stool, which warrants an \"emergent\" procedure need per [facility]/Mercedes's office.
Medications administered and monitored by CRNA, see anesthesia record.
Pt awake mumbling to self occasionally , on to phase 2
Pt discharged in stable condition with sitter. Discharge instruction to sitter who verbalized she would give to her nurse - stated I did not need to call nurse. Pt alert to self and appropriate for self. Pt had soda and cookies and tolerated well. Pt offers no c/o and appears in no distress or discomfort. Abdomen soft. Iv dc d with site wnl.
Pt nonverbal incomprehensible when speaking, nursing home manager with pt. Pt can stand with assistance, assisted pt to dress. Pt stable in bed.
Pt to pacu from endo , abd soft.  IV infusing, sleeping
child scheduled for surgery and plan to stay at the hospital until the child is discharged. Please do not bring other children with you. For your comfort, please wear simple loose fitting clothing to the hospital.  Please do not bring valuables. Do not wear any make-up or nail polish on your fingers or toes. For your safety, please do not wear any jewelry or body piercing's on the day of surgery. All jewelry must be removed. If you have dentures, they will be removed before going to operating room, please do not use denture adhesive the day of your surgery. For your convenience, we will provide you with a container. If you wear contact lenses or glasses, they will be removed, please bring a case for them. If you have a living will and a durable power of  for healthcare, please bring in a copy. As part of our patient safety program to minimize surgical site infections, we ask you to do the following:  Please notify your surgeon if you develop any illness between         now and the day of your surgery. This includes a cough, cold, fever, sore throat, nausea,         or vomiting, and diarrhea, etc.   Please notify your surgeon if you experience dizziness, shortness         of breath or blurred vision between now and the time of your surgery. Do not shave your operative site 96 hours prior to surgery. You may shower the night before surgery or the morning of  your surgery with an antibacterial soap. No fancy, lotions, colognes or powders on your body the day of the surgery. If you use a C-pap or Bi-pap machine, please bring your machine with you to the hospital as instructed    Our goal is to provide you with excellent care, therefore, visitors will be limited to so that we may focus on providing this care for you. If you have any questions or concerns, please feel free to contact our department. Thank you and Be Well!

## 2023-11-01 ENCOUNTER — OFFICE VISIT (OUTPATIENT)
Dept: ENT CLINIC | Age: 38
End: 2023-11-01
Payer: MEDICARE

## 2023-11-01 VITALS — HEIGHT: 66 IN | WEIGHT: 190 LBS | BODY MASS INDEX: 30.53 KG/M2

## 2023-11-01 DIAGNOSIS — H61.23 BILATERAL IMPACTED CERUMEN: Primary | ICD-10-CM

## 2023-11-01 DIAGNOSIS — H60.392 OTHER INFECTIVE CHRONIC OTITIS EXTERNA OF LEFT EAR: ICD-10-CM

## 2023-11-01 PROCEDURE — 1036F TOBACCO NON-USER: CPT | Performed by: OTOLARYNGOLOGY

## 2023-11-01 PROCEDURE — G8417 CALC BMI ABV UP PARAM F/U: HCPCS | Performed by: OTOLARYNGOLOGY

## 2023-11-01 PROCEDURE — 69210 REMOVE IMPACTED EAR WAX UNI: CPT | Performed by: OTOLARYNGOLOGY

## 2023-11-01 PROCEDURE — G8428 CUR MEDS NOT DOCUMENT: HCPCS | Performed by: OTOLARYNGOLOGY

## 2023-11-01 PROCEDURE — G8484 FLU IMMUNIZE NO ADMIN: HCPCS | Performed by: OTOLARYNGOLOGY

## 2023-11-01 PROCEDURE — 99203 OFFICE O/P NEW LOW 30 MIN: CPT | Performed by: OTOLARYNGOLOGY

## 2023-11-01 PROCEDURE — 4130F TOPICAL PREP RX AOE: CPT | Performed by: OTOLARYNGOLOGY

## 2023-11-01 NOTE — PROGRESS NOTES
[address]      Patient Name: Aileen Carrion  Medical Record Number:  [id number]  Primary Care Physician:  Jose Hernandez  Date of Consultation: 11/1/2023    Chief Complaint: Ear issues        HISTORY OF PRESENT Vlad Alvarado is a(n) 43 y.o. female who presents for evaluation of ear issues. The patient was brought from her facility to evaluate her ears. They have noted that she sometimes tries to touch them. They are not sure if she has had any ear surgeries, but as far as they are aware she has not. REVIEW OF SYSTEMS  As above    PHYSICAL EXAM  GENERAL: No Acute Distress, Alert and Oriented, no Hoarseness, strong voice  EYES: EOMI, Anti-icteric  HENT:   Head: Normocephalic and atraumatic. Face:  Symmetric, facial nerve intact, no sinus tenderness  Ears: See below          PROCEDURE  Bilateral ear exam with cerumen removal  Right ear was visualized binocular scope. She had a dense cerumen impaction that I removed with a combination of a Bruno suction and right angle. On the left side she again had a dense cerumen impaction. I removed some of this. She has some inflammation of the ear canal is difficult to get the more medial wax. In addition the patient was not tolerating the cleaning very well. ASSESSMENT/PLAN  1. Bilateral impacted cerumen  I removed all the wax on the right side and some of it on the left. I would give her a round of eardrops for the inflammation and have her come back in a week or 2 to remove the rest of the left side. 2. Other infective chronic otitis externa of left ear  Cortisporin drops and follow-up in 1 to 2 weeks to remove the rest of the wax. I have performed a head and neck physical exam personally or was physically present during the key or critical portions of the service.     This note was generated completely or in part utilizing Dragon dictation speech recognition

## 2023-11-15 ENCOUNTER — OFFICE VISIT (OUTPATIENT)
Dept: ENT CLINIC | Age: 38
End: 2023-11-15
Payer: MEDICARE

## 2023-11-15 DIAGNOSIS — H61.22 IMPACTED CERUMEN OF LEFT EAR: Primary | ICD-10-CM

## 2023-11-15 PROCEDURE — 69210 REMOVE IMPACTED EAR WAX UNI: CPT | Performed by: OTOLARYNGOLOGY

## 2023-11-15 NOTE — PROGRESS NOTES
Patient following up for cerumen impactions. I saw November 1, 2023 and she had pretty bad bilateral cerumen impactions with an otitis externa on the left. I had her use eardrops to clear up the infection and the loosen the wax. Procedure  Bilateral ear exam and cerumen removal  The right ear was visualized with the binocular scope. There was just a small amount of wax that a left in place. On the left side she again had some cerumen pushed against tympanic membrane. I was able to remove most of this. I left a little bit on the tympanic membrane as it was too uncomfortable to remove. Plan  I removed most of the cerumen the left side today. I think she should follow-up in 6 months to make sure that it is not reaccumulating.

## 2024-05-15 ENCOUNTER — OFFICE VISIT (OUTPATIENT)
Dept: ENT CLINIC | Age: 39
End: 2024-05-15
Payer: MEDICARE

## 2024-05-15 VITALS — DIASTOLIC BLOOD PRESSURE: 87 MMHG | SYSTOLIC BLOOD PRESSURE: 123 MMHG | HEART RATE: 78 BPM

## 2024-05-15 DIAGNOSIS — H61.23 BILATERAL IMPACTED CERUMEN: Primary | ICD-10-CM

## 2024-05-15 PROCEDURE — 69210 REMOVE IMPACTED EAR WAX UNI: CPT | Performed by: OTOLARYNGOLOGY

## 2024-05-15 NOTE — PROGRESS NOTES
Patient is following up for cerumen impactions.  Her caregivers have not noticed any specific problems since I saw her in November.    Procedure  Bilateral ear exam with cerumen removal.  The right ear was visualized binocular scope.  The patient had a cerumen impaction I removed with alligator forceps.  I had to leave some wax against the tympanic membrane secondary to patient discomfort.  On the left side similar exam findings with cerumen impaction that I removed with alligator forceps.  Again I had to leave some against the tympanic membrane secondary to patient discomfort.    Plan  I was able to removed a lot of the wax today.  Unfortunately she cannot really tolerate me doing a thorough debridement, but I do not see any concerning findings such as infection.  I think we just need to continue to debride that the best we can every 6 months.  I will see her at that time

## 2024-05-24 ENCOUNTER — TRANSCRIBE ORDERS (OUTPATIENT)
Dept: ADMINISTRATIVE | Age: 39
End: 2024-05-24

## 2024-05-24 DIAGNOSIS — K80.20 CALCULUS OF GALLBLADDER WITHOUT CHOLECYSTITIS WITHOUT OBSTRUCTION: Primary | ICD-10-CM

## 2024-06-06 ENCOUNTER — HOSPITAL ENCOUNTER (OUTPATIENT)
Dept: CT IMAGING | Age: 39
Discharge: HOME | End: 2024-06-06
Payer: MEDICARE

## 2024-06-06 DIAGNOSIS — K80.20 CALCULUS OF GALLBLADDER WITHOUT CHOLECYSTITIS WITHOUT OBSTRUCTION: ICD-10-CM

## 2024-06-06 PROCEDURE — 6360000004 HC RX CONTRAST MEDICATION: Performed by: FAMILY MEDICINE

## 2024-06-06 PROCEDURE — 74177 CT ABD & PELVIS W/CONTRAST: CPT

## 2024-06-06 RX ADMIN — IOPAMIDOL 75 ML: 755 INJECTION, SOLUTION INTRAVENOUS at 12:41

## 2024-06-06 RX ADMIN — IOPAMIDOL 50 ML: 612 INJECTION, SOLUTION INTRAVENOUS at 12:40

## (undated) DEVICE — ENDOSCOPY KIT: Brand: MEDLINE INDUSTRIES, INC.

## (undated) DEVICE — GOWN SIRUS NONREIN LG W/TWL: Brand: MEDLINE INDUSTRIES, INC.

## (undated) DEVICE — BLADE ES ELASTOMERIC COAT INSUL DURABLE BEND UPTO 90DEG

## (undated) DEVICE — SHEET, T, LAPAROTOMY, STERILE: Brand: MEDLINE

## (undated) DEVICE — SKIN MARKER REGULAR TIP WITH RULER CAP AND LABELS: Brand: DEVON

## (undated) DEVICE — GLOVE ORANGE PI 7   MSG9070

## (undated) DEVICE — FORCEPS BX 240CM 2.4MM L NDL RAD JAW 4 M00513334

## (undated) DEVICE — BITE BLOCK ENDOSCP AD 60 FR W/ ADJ STRP PLAS GRN BLOX

## (undated) DEVICE — PROVE COVER: Brand: UNBRANDED

## (undated) DEVICE — SOLUTION IV IRRIG 500ML 0.9% SODIUM CHL 2F7123

## (undated) DEVICE — ADHESIVE SKIN CLOSURE TOP 36 CC HI VISC DERMBND MINI

## (undated) DEVICE — FORMALIN CLEAR VIAL 20 ML 10%

## (undated) DEVICE — TUBING, SUCTION, 1/4" X 12', STRAIGHT: Brand: MEDLINE

## (undated) DEVICE — MINOR SET UP: Brand: MEDLINE INDUSTRIES, INC.

## (undated) DEVICE — SUTURE MCRYL + SZ 4-0 L18IN ABSRB UD L19MM PS-2 3/8 CIR MCP496G

## (undated) DEVICE — BRA SURG SILK 42

## (undated) DEVICE — BLADE ES L4IN INSUL EDGE

## (undated) DEVICE — ATTACHMENT SMK EVAC FOR ES PNCL ACCUVAC

## (undated) DEVICE — SUTURE VCRL + SZ 3-0 L27IN ABSRB UD L26MM SH 1/2 CIR VCP416H

## (undated) DEVICE — CANISTER, RIGID, 1200CC: Brand: MEDLINE INDUSTRIES, INC.